# Patient Record
Sex: MALE | Race: BLACK OR AFRICAN AMERICAN | Employment: UNEMPLOYED | URBAN - METROPOLITAN AREA
[De-identification: names, ages, dates, MRNs, and addresses within clinical notes are randomized per-mention and may not be internally consistent; named-entity substitution may affect disease eponyms.]

---

## 2023-12-17 ENCOUNTER — APPOINTMENT (OUTPATIENT)
Dept: GENERAL RADIOLOGY | Facility: HOSPITAL | Age: 38
End: 2023-12-17
Attending: EMERGENCY MEDICINE
Payer: MEDICAID

## 2023-12-17 ENCOUNTER — APPOINTMENT (OUTPATIENT)
Dept: CT IMAGING | Facility: HOSPITAL | Age: 38
End: 2023-12-17
Attending: EMERGENCY MEDICINE
Payer: MEDICAID

## 2023-12-17 ENCOUNTER — APPOINTMENT (OUTPATIENT)
Dept: ULTRASOUND IMAGING | Facility: HOSPITAL | Age: 38
End: 2023-12-17
Attending: EMERGENCY MEDICINE
Payer: MEDICAID

## 2023-12-17 ENCOUNTER — HOSPITAL ENCOUNTER (INPATIENT)
Facility: HOSPITAL | Age: 38
LOS: 3 days | Discharge: HOME | End: 2023-12-20
Attending: EMERGENCY MEDICINE | Admitting: INTERNAL MEDICINE
Payer: MEDICAID

## 2023-12-17 DIAGNOSIS — J01.90 ACUTE SINUSITIS, RECURRENCE NOT SPECIFIED, UNSPECIFIED LOCATION: ICD-10-CM

## 2023-12-17 DIAGNOSIS — E87.5 HYPERKALEMIA: ICD-10-CM

## 2023-12-17 DIAGNOSIS — R11.2 NAUSEA AND VOMITING, UNSPECIFIED VOMITING TYPE: ICD-10-CM

## 2023-12-17 DIAGNOSIS — H91.93 BILATERAL CHANGE IN HEARING: Primary | ICD-10-CM

## 2023-12-17 DIAGNOSIS — N50.812 PAIN IN LEFT TESTICLE: ICD-10-CM

## 2023-12-17 DIAGNOSIS — N45.2 ORCHITIS: ICD-10-CM

## 2023-12-17 DIAGNOSIS — E11.10 DIABETIC KETOACIDOSIS WITHOUT COMA ASSOCIATED WITH TYPE 2 DIABETES MELLITUS (HCC): ICD-10-CM

## 2023-12-17 PROBLEM — E10.10 TYPE 1 DIABETES MELLITUS WITH KETOACIDOSIS WITHOUT COMA (HCC): Status: ACTIVE | Noted: 2023-12-17

## 2023-12-17 PROBLEM — E11.00 TYPE 2 DIABETES MELLITUS WITH HYPEROSMOLAR HYPERGLYCEMIC STATE (HHS) (HCC): Status: ACTIVE | Noted: 2023-12-17

## 2023-12-17 PROBLEM — E87.1 HYPONATREMIA: Status: ACTIVE | Noted: 2023-12-17

## 2023-12-17 PROBLEM — R73.9 HYPERGLYCEMIA: Status: ACTIVE | Noted: 2023-12-17

## 2023-12-17 PROBLEM — E87.20 METABOLIC ACIDOSIS: Status: ACTIVE | Noted: 2023-12-17

## 2023-12-17 LAB
ALBUMIN SERPL-MCNC: 4.5 G/DL (ref 3.4–5)
ALBUMIN/GLOB SERPL: 1.1 {RATIO} (ref 1–2)
ALP LIVER SERPL-CCNC: 145 U/L
ALT SERPL-CCNC: 28 U/L
ANION GAP SERPL CALC-SCNC: 17 MMOL/L (ref 0–18)
ANION GAP SERPL CALC-SCNC: 21 MMOL/L (ref 0–18)
ANION GAP SERPL CALC-SCNC: 24 MMOL/L (ref 0–18)
AST SERPL-CCNC: 9 U/L (ref 15–37)
BASE EXCESS BLDV CALC-SCNC: -17 MMOL/L
BASOPHILS # BLD AUTO: 0.04 X10(3) UL (ref 0–0.2)
BASOPHILS NFR BLD AUTO: 0.5 %
BILIRUB SERPL-MCNC: 1.1 MG/DL (ref 0.1–2)
BILIRUB UR QL STRIP.AUTO: NEGATIVE
BUN BLD-MCNC: 27 MG/DL (ref 9–23)
BUN BLD-MCNC: 30 MG/DL (ref 9–23)
BUN BLD-MCNC: 31 MG/DL (ref 9–23)
CA-I BLD-SCNC: 1.03 MMOL/L (ref 0.95–1.32)
CALCIUM BLD-MCNC: 7.7 MG/DL (ref 8.5–10.1)
CALCIUM BLD-MCNC: 8.2 MG/DL (ref 8.5–10.1)
CALCIUM BLD-MCNC: 8.8 MG/DL (ref 8.5–10.1)
CHLORIDE SERPL-SCNC: 106 MMOL/L (ref 98–112)
CHLORIDE SERPL-SCNC: 86 MMOL/L (ref 98–112)
CHLORIDE SERPL-SCNC: 98 MMOL/L (ref 98–112)
CLARITY UR REFRACT.AUTO: CLEAR
CO2 SERPL-SCNC: 12 MMOL/L (ref 21–32)
CO2 SERPL-SCNC: 13 MMOL/L (ref 21–32)
CO2 SERPL-SCNC: 14 MMOL/L (ref 21–32)
COLOR UR AUTO: COLORLESS
CREAT BLD-MCNC: 2.58 MG/DL
CREAT BLD-MCNC: 2.84 MG/DL
CREAT BLD-MCNC: 3.17 MG/DL
EGFRCR SERPLBLD CKD-EPI 2021: 25 ML/MIN/1.73M2 (ref 60–?)
EGFRCR SERPLBLD CKD-EPI 2021: 29 ML/MIN/1.73M2 (ref 60–?)
EGFRCR SERPLBLD CKD-EPI 2021: 32 ML/MIN/1.73M2 (ref 60–?)
EOSINOPHIL # BLD AUTO: 0.01 X10(3) UL (ref 0–0.7)
EOSINOPHIL NFR BLD AUTO: 0.1 %
ERYTHROCYTE [DISTWIDTH] IN BLOOD BY AUTOMATED COUNT: 13.2 %
FLUAV + FLUBV RNA SPEC NAA+PROBE: NEGATIVE
FLUAV + FLUBV RNA SPEC NAA+PROBE: NEGATIVE
GLOBULIN PLAS-MCNC: 4.1 G/DL (ref 2.8–4.4)
GLUCOSE BLD-MCNC: 244 MG/DL (ref 70–99)
GLUCOSE BLD-MCNC: 294 MG/DL (ref 70–99)
GLUCOSE BLD-MCNC: 325 MG/DL (ref 70–99)
GLUCOSE BLD-MCNC: 445 MG/DL (ref 70–99)
GLUCOSE BLD-MCNC: 445 MG/DL (ref 70–99)
GLUCOSE BLD-MCNC: 479 MG/DL (ref 70–99)
GLUCOSE BLD-MCNC: 484 MG/DL (ref 70–99)
GLUCOSE BLD-MCNC: 593 MG/DL (ref 70–99)
GLUCOSE BLD-MCNC: 791 MG/DL (ref 70–99)
GLUCOSE BLD-MCNC: 961 MG/DL (ref 70–99)
GLUCOSE UR STRIP.AUTO-MCNC: >1000 MG/DL
HCO3 BLDV-SCNC: 10.6 MEQ/L (ref 22–26)
HCT VFR BLD AUTO: 55 %
HGB BLD-MCNC: 18.6 G/DL
IMM GRANULOCYTES # BLD AUTO: 0.04 X10(3) UL (ref 0–1)
IMM GRANULOCYTES NFR BLD: 0.5 %
KETONES UR STRIP.AUTO-MCNC: 100 MG/DL
LEUKOCYTE ESTERASE UR QL STRIP.AUTO: NEGATIVE
LIPASE SERPL-CCNC: 117 U/L (ref 13–75)
LYMPHOCYTES # BLD AUTO: 1.58 X10(3) UL (ref 1–4)
LYMPHOCYTES NFR BLD AUTO: 19 %
MAGNESIUM SERPL-MCNC: 2.8 MG/DL (ref 1.6–2.6)
MCH RBC QN AUTO: 28.5 PG (ref 26–34)
MCHC RBC AUTO-ENTMCNC: 33.8 G/DL (ref 31–37)
MCV RBC AUTO: 84.2 FL
MONOCYTES # BLD AUTO: 0.54 X10(3) UL (ref 0.1–1)
MONOCYTES NFR BLD AUTO: 6.5 %
MRSA DNA SPEC QL NAA+PROBE: NEGATIVE
NEUTROPHILS # BLD AUTO: 6.12 X10 (3) UL (ref 1.5–7.7)
NEUTROPHILS # BLD AUTO: 6.12 X10(3) UL (ref 1.5–7.7)
NEUTROPHILS NFR BLD AUTO: 73.4 %
NITRITE UR QL STRIP.AUTO: NEGATIVE
OSMOLALITY SERPL CALC.SUM OF ELEC: 308 MOSM/KG (ref 275–295)
OSMOLALITY SERPL CALC.SUM OF ELEC: 310 MOSM/KG (ref 275–295)
OSMOLALITY SERPL CALC.SUM OF ELEC: 317 MOSM/KG (ref 275–295)
OXYHGB MFR BLDV: 59.8 % (ref 72–78)
PCO2 BLDV: 32 MM HG (ref 38–50)
PH BLDV: 7.14 [PH] (ref 7.33–7.43)
PH UR STRIP.AUTO: 5 [PH] (ref 5–8)
PHOSPHATE SERPL-MCNC: 3.2 MG/DL (ref 2.5–4.9)
PLATELET # BLD AUTO: 320 10(3)UL (ref 150–450)
PO2 BLDV: 36 MM HG (ref 30–50)
POTASSIUM BLD-SCNC: 8.2 MMOL/L (ref 3.6–5.1)
POTASSIUM SERPL-SCNC: 4.1 MMOL/L (ref 3.5–5.1)
POTASSIUM SERPL-SCNC: 5.2 MMOL/L (ref 3.5–5.1)
POTASSIUM SERPL-SCNC: 6.8 MMOL/L (ref 3.5–5.1)
PROT SERPL-MCNC: 8.6 G/DL (ref 6.4–8.2)
RBC # BLD AUTO: 6.53 X10(6)UL
RSV RNA SPEC NAA+PROBE: NEGATIVE
SARS-COV-2 RNA RESP QL NAA+PROBE: NOT DETECTED
SODIUM BLD-SCNC: 119 MMOL/L (ref 135–145)
SODIUM SERPL-SCNC: 123 MMOL/L (ref 136–145)
SODIUM SERPL-SCNC: 131 MMOL/L (ref 136–145)
SODIUM SERPL-SCNC: 137 MMOL/L (ref 136–145)
SP GR UR STRIP.AUTO: 1.03 (ref 1–1.03)
TROPONIN I SERPL HS-MCNC: 33 NG/L
UROBILINOGEN UR STRIP.AUTO-MCNC: NORMAL MG/DL
WBC # BLD AUTO: 8.3 X10(3) UL (ref 4–11)

## 2023-12-17 PROCEDURE — 3044F HG A1C LEVEL LT 7.0%: CPT | Performed by: NURSE PRACTITIONER

## 2023-12-17 PROCEDURE — 99291 CRITICAL CARE FIRST HOUR: CPT | Performed by: NURSE PRACTITIONER

## 2023-12-17 PROCEDURE — 99291 CRITICAL CARE FIRST HOUR: CPT | Performed by: HOSPITALIST

## 2023-12-17 PROCEDURE — 93975 VASCULAR STUDY: CPT | Performed by: EMERGENCY MEDICINE

## 2023-12-17 PROCEDURE — 71045 X-RAY EXAM CHEST 1 VIEW: CPT | Performed by: EMERGENCY MEDICINE

## 2023-12-17 PROCEDURE — 70450 CT HEAD/BRAIN W/O DYE: CPT | Performed by: EMERGENCY MEDICINE

## 2023-12-17 PROCEDURE — 76870 US EXAM SCROTUM: CPT | Performed by: EMERGENCY MEDICINE

## 2023-12-17 RX ORDER — VANCOMYCIN 2 GRAM/500 ML IN 0.9 % SODIUM CHLORIDE INTRAVENOUS
2000 ONCE
Status: DISCONTINUED | OUTPATIENT
Start: 2023-12-17 | End: 2023-12-17

## 2023-12-17 RX ORDER — PROCHLORPERAZINE EDISYLATE 5 MG/ML
5 INJECTION INTRAMUSCULAR; INTRAVENOUS EVERY 8 HOURS PRN
Status: DISCONTINUED | OUTPATIENT
Start: 2023-12-17 | End: 2023-12-20

## 2023-12-17 RX ORDER — ECHINACEA PURPUREA EXTRACT 125 MG
1 TABLET ORAL
Status: DISCONTINUED | OUTPATIENT
Start: 2023-12-17 | End: 2023-12-20

## 2023-12-17 RX ORDER — ACETAMINOPHEN 500 MG
1000 TABLET ORAL EVERY 4 HOURS PRN
Status: DISCONTINUED | OUTPATIENT
Start: 2023-12-17 | End: 2023-12-20

## 2023-12-17 RX ORDER — DEXTROSE AND SODIUM CHLORIDE 5; .45 G/100ML; G/100ML
100 INJECTION, SOLUTION INTRAVENOUS CONTINUOUS PRN
Status: DISCONTINUED | OUTPATIENT
Start: 2023-12-17 | End: 2023-12-20

## 2023-12-17 RX ORDER — NICOTINE 21 MG/24HR
1 PATCH, TRANSDERMAL 24 HOURS TRANSDERMAL DAILY
Status: DISCONTINUED | OUTPATIENT
Start: 2023-12-17 | End: 2023-12-20

## 2023-12-17 RX ORDER — CALCIUM GLUCONATE 10 MG/ML
1 INJECTION, SOLUTION INTRAVENOUS ONCE
Status: COMPLETED | OUTPATIENT
Start: 2023-12-17 | End: 2023-12-17

## 2023-12-17 RX ORDER — NICOTINE POLACRILEX 4 MG
15 LOZENGE BUCCAL
Status: DISCONTINUED | OUTPATIENT
Start: 2023-12-17 | End: 2023-12-20

## 2023-12-17 RX ORDER — SENNOSIDES 8.6 MG
17.2 TABLET ORAL NIGHTLY PRN
Status: DISCONTINUED | OUTPATIENT
Start: 2023-12-17 | End: 2023-12-20

## 2023-12-17 RX ORDER — SODIUM CHLORIDE, SODIUM LACTATE, POTASSIUM CHLORIDE, CALCIUM CHLORIDE 600; 310; 30; 20 MG/100ML; MG/100ML; MG/100ML; MG/100ML
INJECTION, SOLUTION INTRAVENOUS ONCE
Status: COMPLETED | OUTPATIENT
Start: 2023-12-17 | End: 2023-12-17

## 2023-12-17 RX ORDER — LABETALOL HYDROCHLORIDE 5 MG/ML
10 INJECTION, SOLUTION INTRAVENOUS EVERY 4 HOURS PRN
Status: DISCONTINUED | OUTPATIENT
Start: 2023-12-17 | End: 2023-12-20

## 2023-12-17 RX ORDER — BISACODYL 10 MG
10 SUPPOSITORY, RECTAL RECTAL
Status: DISCONTINUED | OUTPATIENT
Start: 2023-12-17 | End: 2023-12-20

## 2023-12-17 RX ORDER — ONDANSETRON 2 MG/ML
4 INJECTION INTRAMUSCULAR; INTRAVENOUS EVERY 6 HOURS PRN
Status: DISCONTINUED | OUTPATIENT
Start: 2023-12-17 | End: 2023-12-20

## 2023-12-17 RX ORDER — ONDANSETRON 2 MG/ML
4 INJECTION INTRAMUSCULAR; INTRAVENOUS ONCE
Status: COMPLETED | OUTPATIENT
Start: 2023-12-17 | End: 2023-12-17

## 2023-12-17 RX ORDER — POLYETHYLENE GLYCOL 3350 17 G/17G
17 POWDER, FOR SOLUTION ORAL DAILY PRN
Status: DISCONTINUED | OUTPATIENT
Start: 2023-12-17 | End: 2023-12-20

## 2023-12-17 RX ORDER — NICOTINE POLACRILEX 4 MG
30 LOZENGE BUCCAL
Status: DISCONTINUED | OUTPATIENT
Start: 2023-12-17 | End: 2023-12-20

## 2023-12-17 RX ORDER — HEPARIN SODIUM 5000 [USP'U]/ML
5000 INJECTION, SOLUTION INTRAVENOUS; SUBCUTANEOUS EVERY 8 HOURS SCHEDULED
Status: DISCONTINUED | OUTPATIENT
Start: 2023-12-17 | End: 2023-12-18

## 2023-12-17 RX ORDER — DEXTROSE MONOHYDRATE 25 G/50ML
50 INJECTION, SOLUTION INTRAVENOUS
Status: DISCONTINUED | OUTPATIENT
Start: 2023-12-17 | End: 2023-12-20

## 2023-12-17 RX ORDER — MELATONIN
3 NIGHTLY PRN
Status: DISCONTINUED | OUTPATIENT
Start: 2023-12-17 | End: 2023-12-20

## 2023-12-17 RX ORDER — SODIUM CHLORIDE 9 MG/ML
INJECTION, SOLUTION INTRAVENOUS CONTINUOUS
Status: DISCONTINUED | OUTPATIENT
Start: 2023-12-17 | End: 2023-12-20

## 2023-12-17 RX ORDER — ENEMA 19; 7 G/133ML; G/133ML
1 ENEMA RECTAL ONCE AS NEEDED
Status: DISCONTINUED | OUTPATIENT
Start: 2023-12-17 | End: 2023-12-20

## 2023-12-17 NOTE — PROGRESS NOTES
ED Antibiotic Dose Adjustment by Pharmacy    Piperacillin-tazobactam 3.375 gm IVPB x 1 dose has been ordered in the ED. Pharmacy has adjusted the dose to piperacillin-tazobactam 4.5 gm IVPB x1 per hospital protocol based on actual body weight >100 kg.     Thank you,  Yunior Roy, PharmD, BCPS, [facility]  12/17/23; 4:04 PM

## 2023-12-18 PROBLEM — H66.90 ACUTE OTITIS MEDIA: Status: ACTIVE | Noted: 2023-12-18

## 2023-12-18 PROBLEM — N45.2 ORCHITIS: Status: ACTIVE | Noted: 2023-12-18

## 2023-12-18 LAB
ANION GAP SERPL CALC-SCNC: 8 MMOL/L (ref 0–18)
ANION GAP SERPL CALC-SCNC: 9 MMOL/L (ref 0–18)
ATRIAL RATE: 106 BPM
ATRIAL RATE: 119 BPM
BUN BLD-MCNC: 26 MG/DL (ref 9–23)
BUN BLD-MCNC: 26 MG/DL (ref 9–23)
C TRACH DNA SPEC QL NAA+PROBE: NEGATIVE
CALCIUM BLD-MCNC: 8.2 MG/DL (ref 8.5–10.1)
CALCIUM BLD-MCNC: 8.3 MG/DL (ref 8.5–10.1)
CHLORIDE SERPL-SCNC: 109 MMOL/L (ref 98–112)
CHLORIDE SERPL-SCNC: 110 MMOL/L (ref 98–112)
CO2 SERPL-SCNC: 20 MMOL/L (ref 21–32)
CO2 SERPL-SCNC: 20 MMOL/L (ref 21–32)
CREAT BLD-MCNC: 1.98 MG/DL
CREAT BLD-MCNC: 2.53 MG/DL
CREAT UR-SCNC: 161 MG/DL
EGFRCR SERPLBLD CKD-EPI 2021: 33 ML/MIN/1.73M2 (ref 60–?)
EGFRCR SERPLBLD CKD-EPI 2021: 44 ML/MIN/1.73M2 (ref 60–?)
ERYTHROCYTE [DISTWIDTH] IN BLOOD BY AUTOMATED COUNT: 12.7 %
GLUCOSE BLD-MCNC: 155 MG/DL (ref 70–99)
GLUCOSE BLD-MCNC: 166 MG/DL (ref 70–99)
GLUCOSE BLD-MCNC: 169 MG/DL (ref 70–99)
GLUCOSE BLD-MCNC: 175 MG/DL (ref 70–99)
GLUCOSE BLD-MCNC: 184 MG/DL (ref 70–99)
GLUCOSE BLD-MCNC: 190 MG/DL (ref 70–99)
GLUCOSE BLD-MCNC: 197 MG/DL (ref 70–99)
GLUCOSE BLD-MCNC: 209 MG/DL (ref 70–99)
GLUCOSE BLD-MCNC: 210 MG/DL (ref 70–99)
GLUCOSE BLD-MCNC: 268 MG/DL (ref 70–99)
GLUCOSE BLD-MCNC: 268 MG/DL (ref 70–99)
GLUCOSE BLD-MCNC: 294 MG/DL (ref 70–99)
GLUCOSE BLD-MCNC: 317 MG/DL (ref 70–99)
HCT VFR BLD AUTO: 44.3 %
HGB BLD-MCNC: 15.7 G/DL
MAGNESIUM SERPL-MCNC: 2.6 MG/DL (ref 1.6–2.6)
MAGNESIUM SERPL-MCNC: 2.6 MG/DL (ref 1.6–2.6)
MCH RBC QN AUTO: 28.5 PG (ref 26–34)
MCHC RBC AUTO-ENTMCNC: 35.4 G/DL (ref 31–37)
MCV RBC AUTO: 80.4 FL
MICROALBUMIN UR-MCNC: 4.73 MG/DL
MICROALBUMIN/CREAT 24H UR-RTO: 29.4 UG/MG (ref ?–30)
N GONORRHOEA DNA SPEC QL NAA+PROBE: NEGATIVE
OSMOLALITY SERPL CALC.SUM OF ELEC: 296 MOSM/KG (ref 275–295)
OSMOLALITY SERPL CALC.SUM OF ELEC: 300 MOSM/KG (ref 275–295)
P AXIS: 17 DEGREES
P AXIS: 25 DEGREES
P-R INTERVAL: 166 MS
P-R INTERVAL: 182 MS
PHOSPHATE SERPL-MCNC: 1.2 MG/DL (ref 2.5–4.9)
PHOSPHATE SERPL-MCNC: 2.3 MG/DL (ref 2.5–4.9)
PLATELET # BLD AUTO: 256 10(3)UL (ref 150–450)
POTASSIUM SERPL-SCNC: 3.4 MMOL/L (ref 3.5–5.1)
POTASSIUM SERPL-SCNC: 4 MMOL/L (ref 3.5–5.1)
Q-T INTERVAL: 340 MS
Q-T INTERVAL: 344 MS
QRS DURATION: 84 MS
QRS DURATION: 86 MS
QTC CALCULATION (BEZET): 456 MS
QTC CALCULATION (BEZET): 478 MS
R AXIS: 58 DEGREES
R AXIS: 89 DEGREES
RBC # BLD AUTO: 5.51 X10(6)UL
SODIUM SERPL-SCNC: 138 MMOL/L (ref 136–145)
SODIUM SERPL-SCNC: 138 MMOL/L (ref 136–145)
SODIUM SERPL-SCNC: 28 MMOL/L
T AXIS: 25 DEGREES
T AXIS: 3 DEGREES
URATE SERPL-MCNC: 10.7 MG/DL
VENTRICULAR RATE: 106 BPM
VENTRICULAR RATE: 119 BPM
WBC # BLD AUTO: 7.1 X10(3) UL (ref 4–11)

## 2023-12-18 PROCEDURE — 99233 SBSQ HOSP IP/OBS HIGH 50: CPT | Performed by: INTERNAL MEDICINE

## 2023-12-18 PROCEDURE — 99222 1ST HOSP IP/OBS MODERATE 55: CPT

## 2023-12-18 PROCEDURE — 3061F NEG MICROALBUMINURIA REV: CPT | Performed by: NURSE PRACTITIONER

## 2023-12-18 PROCEDURE — 99233 SBSQ HOSP IP/OBS HIGH 50: CPT | Performed by: HOSPITALIST

## 2023-12-18 RX ORDER — ENOXAPARIN SODIUM 100 MG/ML
40 INJECTION SUBCUTANEOUS DAILY
Status: DISCONTINUED | OUTPATIENT
Start: 2023-12-18 | End: 2023-12-20

## 2023-12-18 RX ORDER — POTASSIUM CHLORIDE 14.9 MG/ML
20 INJECTION INTRAVENOUS ONCE
Qty: 100 ML | Refills: 0 | Status: COMPLETED | OUTPATIENT
Start: 2023-12-18 | End: 2023-12-18

## 2023-12-18 RX ORDER — AMLODIPINE BESYLATE 5 MG/1
5 TABLET ORAL DAILY
Status: DISCONTINUED | OUTPATIENT
Start: 2023-12-18 | End: 2023-12-20

## 2023-12-18 RX ORDER — ENOXAPARIN SODIUM 100 MG/ML
40 INJECTION SUBCUTANEOUS DAILY
Status: DISCONTINUED | OUTPATIENT
Start: 2023-12-18 | End: 2023-12-18

## 2023-12-18 NOTE — PROGRESS NOTES
[facility] Pharmacy Note:  Renal Dose Adjustment for enoxaparin (LOVENOX)    Elida Palmer has been prescribed enoxaparin 30 mg subcutaneously every 24 hours. Estimated Creatinine Clearance: 58.3 mL/min (A) (based on SCr of 1.98 mg/dL (H)). Calculated CrCl greater than 30 mL/min so the dose of Enoxaparin (LOVENOX) has been changed to enoxaparin 40 mg every 24 hours per P&T approved protocol. Pharmacy will continue to follow, and make additional adjustments if needed.       Thank you,  Delia Fox, PharmD  12/18/2023 10:52 AM

## 2023-12-18 NOTE — PLAN OF CARE
Assumed care of pt at approximately 2000. Received pt resting in bed on RA, insulin gtt and IVF infusing. Pt A&Ox4. 2L NC overnight. NSR/ST on monitor. Afebrile. Up to bathroom w/standby assist. Bmx1, c/o diarrhea, C. Diff protocol, need to collect stool sample. Urinal at bedside. BMP/Mg/Phos Q4. Denies pain. Transitioned to subcutaneous insulin per orders. See flowsheet/MAR. Family member at bedside overnight.

## 2023-12-19 ENCOUNTER — APPOINTMENT (OUTPATIENT)
Dept: ULTRASOUND IMAGING | Facility: HOSPITAL | Age: 38
End: 2023-12-19
Attending: INTERNAL MEDICINE
Payer: MEDICAID

## 2023-12-19 LAB
ANION GAP SERPL CALC-SCNC: 11 MMOL/L (ref 0–18)
ANION GAP SERPL CALC-SCNC: 8 MMOL/L (ref 0–18)
BASOPHILS # BLD AUTO: 0.03 X10(3) UL (ref 0–0.2)
BASOPHILS NFR BLD AUTO: 0.6 %
BUN BLD-MCNC: 12 MG/DL (ref 9–23)
BUN BLD-MCNC: 14 MG/DL (ref 9–23)
CALCIUM BLD-MCNC: 7.5 MG/DL (ref 8.5–10.1)
CALCIUM BLD-MCNC: 8.1 MG/DL (ref 8.5–10.1)
CHLORIDE SERPL-SCNC: 105 MMOL/L (ref 98–112)
CHLORIDE SERPL-SCNC: 105 MMOL/L (ref 98–112)
CO2 SERPL-SCNC: 19 MMOL/L (ref 21–32)
CO2 SERPL-SCNC: 19 MMOL/L (ref 21–32)
CREAT BLD-MCNC: 1.38 MG/DL
CREAT BLD-MCNC: 1.65 MG/DL
EGFRCR SERPLBLD CKD-EPI 2021: 55 ML/MIN/1.73M2 (ref 60–?)
EGFRCR SERPLBLD CKD-EPI 2021: 68 ML/MIN/1.73M2 (ref 60–?)
EOSINOPHIL # BLD AUTO: 0.08 X10(3) UL (ref 0–0.7)
EOSINOPHIL NFR BLD AUTO: 1.5 %
ERYTHROCYTE [DISTWIDTH] IN BLOOD BY AUTOMATED COUNT: 12.6 %
GLUCOSE BLD-MCNC: 204 MG/DL (ref 70–99)
GLUCOSE BLD-MCNC: 257 MG/DL (ref 70–99)
GLUCOSE BLD-MCNC: 258 MG/DL (ref 70–99)
GLUCOSE BLD-MCNC: 306 MG/DL (ref 70–99)
GLUCOSE BLD-MCNC: 313 MG/DL (ref 70–99)
GLUCOSE BLD-MCNC: 345 MG/DL (ref 70–99)
GLUCOSE BLD-MCNC: >600 MG/DL (ref 70–99)
GLUCOSE BLD-MCNC: >600 MG/DL (ref 70–99)
HCT VFR BLD AUTO: 40.6 %
HGB BLD-MCNC: 13.6 G/DL
HGBA1C: >15.5 %
IMM GRANULOCYTES # BLD AUTO: 0.02 X10(3) UL (ref 0–1)
IMM GRANULOCYTES NFR BLD: 0.4 %
LYMPHOCYTES # BLD AUTO: 2.28 X10(3) UL (ref 1–4)
LYMPHOCYTES NFR BLD AUTO: 42.2 %
MCH RBC QN AUTO: 28.8 PG (ref 26–34)
MCHC RBC AUTO-ENTMCNC: 33.5 G/DL (ref 31–37)
MCV RBC AUTO: 85.8 FL
MONOCYTES # BLD AUTO: 0.37 X10(3) UL (ref 0.1–1)
MONOCYTES NFR BLD AUTO: 6.9 %
NEUTROPHILS # BLD AUTO: 2.62 X10 (3) UL (ref 1.5–7.7)
NEUTROPHILS # BLD AUTO: 2.62 X10(3) UL (ref 1.5–7.7)
NEUTROPHILS NFR BLD AUTO: 48.4 %
OSMOLALITY SERPL CALC.SUM OF ELEC: 288 MOSM/KG (ref 275–295)
OSMOLALITY SERPL CALC.SUM OF ELEC: 289 MOSM/KG (ref 275–295)
PHOSPHATE SERPL-MCNC: 2.4 MG/DL (ref 2.5–4.9)
PLATELET # BLD AUTO: 181 10(3)UL (ref 150–450)
POTASSIUM SERPL-SCNC: 3.6 MMOL/L (ref 3.5–5.1)
POTASSIUM SERPL-SCNC: 3.8 MMOL/L (ref 3.5–5.1)
POTASSIUM SERPL-SCNC: 3.8 MMOL/L (ref 3.5–5.1)
RBC # BLD AUTO: 4.73 X10(6)UL
SODIUM SERPL-SCNC: 132 MMOL/L (ref 136–145)
SODIUM SERPL-SCNC: 135 MMOL/L (ref 136–145)
WBC # BLD AUTO: 5.4 X10(3) UL (ref 4–11)

## 2023-12-19 PROCEDURE — 76775 US EXAM ABDO BACK WALL LIM: CPT | Performed by: INTERNAL MEDICINE

## 2023-12-19 PROCEDURE — 99231 SBSQ HOSP IP/OBS SF/LOW 25: CPT

## 2023-12-19 PROCEDURE — 99233 SBSQ HOSP IP/OBS HIGH 50: CPT | Performed by: STUDENT IN AN ORGANIZED HEALTH CARE EDUCATION/TRAINING PROGRAM

## 2023-12-19 NOTE — PROGRESS NOTES
Received patient from ICU around [address] [place] [address] Rd. Patient A & O x4. VSS, on 2L PRN. C/o trouble with hearing to both ears. IV abx. Safety measures in place. Instructed to use call light.

## 2023-12-19 NOTE — PROGRESS NOTES
Dr. Talbert Drivers called back, he said to call [facility] for this consult. Dr. Donald Petit 's office notified of consult.

## 2023-12-19 NOTE — PROGRESS NOTES
Alert and oriented x 4. Plan to continue educate patient regarding Diabetes. Will have patient administer Insulin subcutaneous and have him watch diabetes education video.

## 2023-12-19 NOTE — PLAN OF CARE
Assumed care of pt at approximately 1930. Received pt resting in bed on RA. Pt A&Ox4. 2L NC overnight. NSR on monitor. Afebrile. Denies pain. Pt still c/o trouble hearing, scheduled IV abx. Pt and family member viewed Understanding Diabetes video. See flowsheet/MAR. Family member at bedside overnight. Transfer orders. Called and gave report to Ortho RN at approximately 2330. Transferred pt w/belongings and family member at approximately 46.

## 2023-12-19 NOTE — PROGRESS NOTES
Dr. Keily Wray ENT on consult for acute bilateral hearing loss  Called in consult for ENT,  Dr. Georgia Hatch on call, message left.

## 2023-12-20 VITALS
HEIGHT: 72.99 IN | TEMPERATURE: 98 F | OXYGEN SATURATION: 96 % | DIASTOLIC BLOOD PRESSURE: 84 MMHG | BODY MASS INDEX: 41.14 KG/M2 | HEART RATE: 87 BPM | SYSTOLIC BLOOD PRESSURE: 134 MMHG | RESPIRATION RATE: 18 BRPM | WEIGHT: 310.44 LBS

## 2023-12-20 LAB
ANION GAP SERPL CALC-SCNC: 8 MMOL/L (ref 0–18)
BUN BLD-MCNC: 8 MG/DL (ref 9–23)
CALCIUM BLD-MCNC: 7.6 MG/DL (ref 8.5–10.1)
CHLORIDE SERPL-SCNC: 109 MMOL/L (ref 98–112)
CO2 SERPL-SCNC: 21 MMOL/L (ref 21–32)
CREAT BLD-MCNC: 1.18 MG/DL
EGFRCR SERPLBLD CKD-EPI 2021: 82 ML/MIN/1.73M2 (ref 60–?)
GAD-65: <5 U/ML
GLUCOSE BLD-MCNC: 150 MG/DL (ref 70–99)
GLUCOSE BLD-MCNC: 156 MG/DL (ref 70–99)
GLUCOSE BLD-MCNC: 175 MG/DL (ref 70–99)
GLUCOSE BLD-MCNC: 176 MG/DL (ref 70–99)
OSMOLALITY SERPL CALC.SUM OF ELEC: 287 MOSM/KG (ref 275–295)
PANCREATIC ISLET CELLS: NEGATIVE
PHOSPHATE SERPL-MCNC: 3.1 MG/DL (ref 2.5–4.9)
POTASSIUM SERPL-SCNC: 3 MMOL/L (ref 3.5–5.1)
POTASSIUM SERPL-SCNC: 3 MMOL/L (ref 3.5–5.1)
POTASSIUM SERPL-SCNC: 3.4 MMOL/L (ref 3.5–5.1)
SODIUM SERPL-SCNC: 138 MMOL/L (ref 136–145)

## 2023-12-20 RX ORDER — OMEPRAZOLE 20 MG/1
20 CAPSULE, DELAYED RELEASE ORAL DAILY
Qty: 30 CAPSULE | Refills: 0 | Status: SHIPPED | OUTPATIENT
Start: 2023-12-20

## 2023-12-20 RX ORDER — LOSARTAN POTASSIUM 25 MG/1
25 TABLET ORAL DAILY
Qty: 90 TABLET | Refills: 0 | Status: SHIPPED | OUTPATIENT
Start: 2023-12-21

## 2023-12-20 RX ORDER — INSULIN LISPRO 100 [IU]/ML
INJECTION, SOLUTION INTRAVENOUS; SUBCUTANEOUS
Qty: 15 ML | Refills: 1 | Status: SHIPPED | OUTPATIENT
Start: 2023-12-20 | End: 2023-12-22

## 2023-12-20 RX ORDER — POTASSIUM CHLORIDE 20 MEQ/1
40 TABLET, EXTENDED RELEASE ORAL EVERY 4 HOURS
Qty: 4 TABLET | Refills: 0 | Status: COMPLETED | OUTPATIENT
Start: 2023-12-20 | End: 2023-12-20

## 2023-12-20 RX ORDER — PANTOPRAZOLE SODIUM 40 MG/1
40 TABLET, DELAYED RELEASE ORAL
Status: DISCONTINUED | OUTPATIENT
Start: 2023-12-20 | End: 2023-12-20

## 2023-12-20 RX ORDER — AMOXICILLIN AND CLAVULANATE POTASSIUM 875; 125 MG/1; MG/1
1 TABLET, FILM COATED ORAL 2 TIMES DAILY
Qty: 8 TABLET | Refills: 0 | Status: SHIPPED | OUTPATIENT
Start: 2023-12-20 | End: 2023-12-24

## 2023-12-20 RX ORDER — INSULIN DETEMIR 100 [IU]/ML
35 INJECTION, SOLUTION SUBCUTANEOUS 2 TIMES DAILY
Qty: 15 ML | Refills: 0 | Status: SHIPPED | OUTPATIENT
Start: 2023-12-20

## 2023-12-20 RX ORDER — INSULIN LISPRO 100 [IU]/ML
INJECTION, SOLUTION INTRAVENOUS; SUBCUTANEOUS
Qty: 15 ML | Refills: 0 | Status: SHIPPED | OUTPATIENT
Start: 2023-12-20 | End: 2023-12-20

## 2023-12-20 RX ORDER — AMLODIPINE BESYLATE 5 MG/1
5 TABLET ORAL DAILY
Qty: 90 TABLET | Refills: 0 | Status: SHIPPED | OUTPATIENT
Start: 2023-12-20 | End: 2024-03-19

## 2023-12-20 RX ORDER — MINERAL OIL/PETROLATUM,WHITE
CREAM (GRAM) TOPICAL AS NEEDED
Status: DISCONTINUED | OUTPATIENT
Start: 2023-12-20 | End: 2023-12-20

## 2023-12-20 RX ORDER — LOSARTAN POTASSIUM 25 MG/1
25 TABLET ORAL DAILY
Status: DISCONTINUED | OUTPATIENT
Start: 2023-12-20 | End: 2023-12-20

## 2023-12-20 RX ORDER — NICOTINE 21 MG/24HR
1 PATCH, TRANSDERMAL 24 HOURS TRANSDERMAL EVERY 24 HOURS
Qty: 28 EACH | Refills: 0 | Status: SHIPPED | OUTPATIENT
Start: 2023-12-20 | End: 2023-12-22 | Stop reason: CLARIF

## 2023-12-20 RX ORDER — PEN NEEDLE, DIABETIC 32GX 5/32"
NEEDLE, DISPOSABLE MISCELLANEOUS
Qty: 100 EACH | Refills: 6 | Status: SHIPPED | OUTPATIENT
Start: 2023-12-20 | End: 2023-12-22 | Stop reason: CLARIF

## 2023-12-20 RX ORDER — BLOOD-GLUCOSE METER
EACH MISCELLANEOUS
Qty: 1 KIT | Refills: 0 | Status: SHIPPED | OUTPATIENT
Start: 2023-12-20 | End: 2023-12-22 | Stop reason: CLARIF

## 2023-12-20 RX ORDER — BLOOD SUGAR DIAGNOSTIC
STRIP MISCELLANEOUS
Qty: 50 STRIP | Refills: 6 | Status: SHIPPED | OUTPATIENT
Start: 2023-12-20

## 2023-12-20 NOTE — DISCHARGE SUMMARY
[facility] PSYCHIATRIC [place] HOSPITALIST  DISCHARGE SUMMARY     Marcia Ernandez Patient Status:  Inpatient    1985 MRN [id number]   [facility] 3SW-A Attending Dane Banda MD   Hosp Day # 3 PCP No primary care provider on file. Date of Admission: 2023  Date of Discharge:   2023     Discharge Disposition: Home    Discharge Diagnosis:  #HHS/DKA  #Uncontrolled DM2 A1c >15  #DONALD, resolved  #B/L otitis media, hearing loss improved  #Possible Left orchitis  #Hyperkalemia with abnormal EKG, resolved  #Hypokalemia, improved  #Acidosis, resolved  #HTN          History of Present Illness: Marcia Ernandez is a 41year old male with a past medical history of HTN, DM. He has had fatigue, weakness and URI symptoms. He comes to the ED with difficulty hearing, blurry vision and testicular swelling. In the ED he is noted to be in DKA with DONALD, otitis media b/l and orchitis. Hyperkalemia treated with improvement. Brief Synopsis: Patient is a 53-year-old male who presented with  Difficulty hearing, blurry vision, testicular swelling. Labs reveal DKA with DONALD. Imaging with signs of otitis media, orchitis. Patient started on IV antibiotics after cultures obtained. He required intervention for hyperkalemia. Continue on IV fluids, insulin drip per protocol. Pulmonary critical care, endocrinology were consulted. Anion gap closed. Patient transition to subcutaneous insulin. Received diabetes education. Currently pending IA-2 autoantibodies, GISSELLE-65 autoantibody. He will follow-up with endocrinology on  as scheduled. Patient transitioning insurance from [place] care Medicaid to [employer] and will be in network for [facility] after . He is aware of his preferred PCP per insurance and plans to make an appointment in 1 week as discussed. TCC referral and appointment scheduled in 2 days to follow-up on glycemic management. Diabetes center referral for further education provided.   Ordered glucometer/supplies and patient will continue on insulin management. [place] sent to pharmacy also. Started on losartan/amlodipine and will need follow-up on blood pressure. He will transition to oral antibiotics at discharge to complete course. He was given referral to follow-up with ENT for hearing assessment. He was instructed to get eye exam.  Discharge home once cleared by consultants. Lace+ Score: 32  59-90 High Risk  29-58 Medium Risk  0-28   Low Risk  Patient was referred to the [facility] Follow-Up Recommendation:  LACE 29-58: Moderate Risk of readmission after discharge from the hospital.      Procedures during hospitalization:  none      Consultants:  Endocrinology, pulmonary critical care         Discharge Medications        START taking these medications        Instructions Prescription details   amLODIPine 5 MG Tabs  Commonly known as: Norvasc      Take 1 tablet (5 mg total) by mouth daily. Stop taking on: March 19, 2024  Quantity: 90 tablet  Refills: 0     amoxicillin clavulanate 875-125 MG Tabs  Commonly known as: Augmentin      Take 1 tablet by mouth 2 (two) times daily for 4 days. Stop taking on: December 24, 2023  Quantity: 8 tablet  Refills: 0     BD Pen Needle Yara U/F 32G X 4 MM Misc  Generic drug: Insulin Pen Needle      Use a new pen needle with each injection as directed by your doctor   Quantity: 100 each  Refills: 6     Insulin Lispro (1 Unit Dial) 100 UNIT/ML Sopn  Commonly known as: HumaLOG KwikPen      Test blood glucose 4 times daily before meals  Inject 1 unit of insulin for every 20 points blood glucose is greater than 140 mg/dL Inject insulin into the skin prior to meals. Inject 8 unit of insulin with each meal, Inject within 10 minutes before or after a meal, max daily dose 80 units   Quantity: 15 mL  Refills: 0     Levemir FlexTouch 100 UNIT/ML Sopn  Generic drug: insulin detemir      Inject 35 Units into the skin 2 (two) times daily. Quantity: 15 mL  Refills: 0     losartan 25 MG Tabs  Commonly known as: Cozaar  Start taking on: December 21, 2023      Take 1 tablet (25 mg total) by mouth daily. Quantity: 90 tablet  Refills: 0     nicotine 14 MG/24HR Pt24  Commonly known as: Nicoderm CQ      Place 1 patch onto the skin daily. Quantity: 28 each  Refills: 0     omeprazole 20 MG Cpdr  Commonly known as: PriLOSEC      Take 1 capsule (20 mg total) by mouth daily. Quantity: 30 capsule  Refills: 0     OneTouch Delica Lancets Fine Misc      Use lancets to check glucose 4 times daily   Quantity: 50 each  Refills: 6     OneTouch Verio Flex System w/Device Kit      Use device to check glucose 4 times daily   Quantity: 1 kit  Refills: 0     OneTouch Verio Strp      Use glucose strips to check glucose 4 times daily   Quantity: 50 strip  Refills: 6               Where to Get Your Medications        These medications were sent to [facility], [phone], [place] [address], [address] [facility], [address] [place] [address]      Phone: [phone]   amLODIPine 5 MG Tabs  amoxicillin clavulanate 875-125 MG Tabs  BD Pen Needle Yara U/F 32G X 4 MM Misc  Levemir FlexTouch 100 UNIT/ML Sopn  losartan 25 MG Tabs  nicotine 14 MG/24HR Pt24  omeprazole 20 MG Cpdr  OneTouch Delica Lancets Fine Misc  OneTouch Verio Flex System w/Device Kit  OneTouch Verio Strp       Please  your prescriptions at the location directed by your doctor or nurse    Bring a paper prescription for each of these medications  Insulin Lispro (1 Unit Dial) 100 UNIT/ML Sopn         [place]    Follow-up appointment:   KIYA Crawford 23  [phone]    Follow up  *note- office does not take [facility], please confirm insurance before scheduling follow up. You can also call [facility] and request names of endocrinology practices that accept your insurance.  Our office DOES take Medicaid- [employer] if you are due for an insurance switch with your [facility]  [address] [facility] Ena Perez [address] [facility] [address] [phone]  Schedule an appointment as soon as possible for a visit in 3 day(s)      eye exam due to newly diagnosed diabetes    Schedule an appointment as soon as possible for a visit in 1 week(s)      Dr. Ole Prince your insurance preferred PCP provided    Schedule an appointment as soon as possible for a visit in 1 week(s)      [facility] [address] [facility] [address]     Schedule an appointment as soon as possible for a visit in 1 week(s)  for hearing loss assessment    [facility] [place]  [address]  [phone]  Schedule an appointment as soon as possible for a visit in 1 week(s)  for further diabetes education - referral ordered in your chart. Future Appointments   Date Time Provider Lebron Beaulieu   2023  1:00 PM ALCON Gutierrez   2024  2:15 PM KIYA Candelario [facility] PATRICIA Marx       -----------------------------------------------------------------------------------------------  PATIENT DISCHARGE INSTRUCTIONS: See electronic chart    GUNNER Porras  2:46 PM     Total time spent on discharge planning:  >30 minutes     The [facility] makes medical notes like these available to patients in the interest of transparency. Please be advised this is a medical document. Medical documents are intended to carry relevant information, facts as evident, and the clinical opinion of the practitioner. The medical note is intended as peer to peer communication and may appear blunt or direct. It is written in medical language and may contain abbreviations or verbiage that are unfamiliar.

## 2023-12-20 NOTE — PLAN OF CARE
Patient A & O x4. VSS, on RA. Denies pain at this time. IVF infusing per order. IV abx. Voiding freely. Safety measures in place. Instructed to use call light.

## 2023-12-20 NOTE — PROGRESS NOTES
AVS reviewed, meds paid for & will be delivered to room, has follow-up appts set up for office visit w/ PCP & for diabetic education, info for diabetes educationgiven, will dc home after dinner.

## 2023-12-20 NOTE — PROGRESS NOTES
Alert and oriented x 4. Noted to have a hard time hearing at times. Ambulated in the hallway, verbalized feeling better after a walk. Continue to educate patient regarding Diabetes. He is able to demonstrate how to do check his blood sugar and inject insulin using insulin pen. BP elevated this morning, Losartan given, BP much better.

## 2023-12-20 NOTE — PLAN OF CARE
Scripts sent for med to bed - will f/u to see what can be filled. Reviewed discharge planning with MD, pt, RN. Updated endo on dc planning and pt switching from [facility] to [facility] after [employer].     Spoke with pt about TCC - called [facility] to coordinate f/u appt in 2 days prior to long holiday weekend. Pt also has name of PCP recommended by insurance and he will try to establish with them.

## 2023-12-21 ENCOUNTER — PATIENT OUTREACH (OUTPATIENT)
Dept: CASE MANAGEMENT | Age: 38
End: 2023-12-21

## 2023-12-21 NOTE — PAYOR COMM NOTE
--------------  DISCHARGE REVIEW    Any Quiroz #:  [id number]  Authorization Number: [id number]    Admit date: 23  Admit time:   5:10 PM  Discharge Date: 2023  6:53 PM     Admitting Physician: Theodore Delaney DO  Attending Physician:  No att. providers found  Primary Care Physician: No primary care provider on file. REVIEWER COMMENTS        [facility]PITAL[facility]  DISCHARGE SUMMARY            Marjan Norman Patient Status:  Inpatient    1985 MRN [id number]   [facility] 3SW-A Attending Clair Tse MD   Hosp Day # 3 PCP No primary care provider on file. Date of Admission: 2023  Date of Discharge:   2023      Discharge Disposition: Home     Discharge Diagnosis:  #HHS/DKA  #Uncontrolled DM2 A1c >15  #DONALD, resolved  #B/L otitis media, hearing loss improved  #Possible Left orchitis  #Hyperkalemia with abnormal EKG, resolved  #Hypokalemia, improved  #Acidosis, resolved  #HTN           History of Present Illness: Marjan Norman is a 41year old male with a past medical history of HTN, DM. He has had fatigue, weakness and URI symptoms. He comes to the ED with difficulty hearing, blurry vision and testicular swelling. In the ED he is noted to be in DKA with DONALD, otitis media b/l and orchitis. Hyperkalemia treated with improvement. Brief Synopsis: Patient is a 61-year-old male who presented with  Difficulty hearing, blurry vision, testicular swelling. Labs reveal DKA with DONALD. Imaging with signs of otitis media, orchitis. Patient started on IV antibiotics after cultures obtained. He required intervention for hyperkalemia. Continue on IV fluids, insulin drip per protocol. Pulmonary critical care, endocrinology were consulted. Anion gap closed. Patient transition to subcutaneous insulin. Received diabetes education. Currently pending IA-2 autoantibodies, GISSELLE-65 autoantibody. He will follow-up with endocrinology on  as scheduled. Patient transitioning insurance from [facility] Medicaid to [employer] and will be in network for [facility] after 1 January. He is aware of his preferred PCP per insurance and plans to make an appointment in 1 week as discussed. TCC referral and appointment scheduled in 2 days to follow-up on glycemic management. Diabetes center referral for further education provided. Ordered glucometer/supplies and patient will continue on insulin management. [place] sent to pharmacy also. Started on losartan/amlodipine and will need follow-up on blood pressure. He will transition to oral antibiotics at discharge to complete course. He was given referral to follow-up with ENT for hearing assessment. He was instructed to get eye exam.  Discharge home once cleared by consultants. Lace+ Score: 32  59-90 High Risk  29-58 Medium Risk  0-28   Low Risk  Patient was referred to the [facility]. TCM Follow-Up Recommendation:  LACE 29-58: Moderate Risk of readmission after discharge from the hospital.        Procedures during hospitalization:  none        Consultants:  Endocrinology, pulmonary critical care            Discharge Medications          START taking these medications         Instructions Prescription details   amLODIPine 5 MG Tabs  Commonly known as: Norvasc       Take 1 tablet (5 mg total) by mouth daily. Stop taking on: March 19, 2024  Quantity: 90 tablet  Refills: 0      amoxicillin clavulanate 875-125 MG Tabs  Commonly known as: Augmentin       Take 1 tablet by mouth 2 (two) times daily for 4 days.     Stop taking on: December 24, 2023  Quantity: 8 tablet  Refills: 0      BD Pen Needle Yara U/F 32G X 4 MM Misc  Generic drug: Insulin Pen Needle       Use a new pen needle with each injection as directed by your doctor    Quantity: 100 each  Refills: 6      Insulin Lispro (1 Unit Dial) 100 UNIT/ML Sopn  Commonly known as: HumaLOG KwikPen       Test blood glucose 4 times daily before meals  Inject 1 unit of insulin for every 20 points blood glucose is greater than 140 mg/dL Inject insulin into the skin prior to meals. Inject 8 unit of insulin with each meal, Inject within 10 minutes before or after a meal, max daily dose 80 units    Quantity: 15 mL  Refills: 0      Levemir FlexTouch 100 UNIT/ML Sopn  Generic drug: insulin detemir       Inject 35 Units into the skin 2 (two) times daily. Quantity: 15 mL  Refills: 0      losartan 25 MG Tabs  Commonly known as: Cozaar  Start taking on: December 21, 2023       Take 1 tablet (25 mg total) by mouth daily. Quantity: 90 tablet  Refills: 0      nicotine 14 MG/24HR Pt24  Commonly known as: Nicoderm CQ       Place 1 patch onto the skin daily. Quantity: 28 each  Refills: 0      omeprazole 20 MG Cpdr  Commonly known as: PriLOSEC       Take 1 capsule (20 mg total) by mouth daily.     Quantity: 30 capsule  Refills: 0      OneTouch Delica Lancets Fine Misc       Use lancets to check glucose 4 times daily    Quantity: 50 each  Refills: 6      OneTouch Verio Flex System w/Device Kit       Use device to check glucose 4 times daily    Quantity: 1 kit  Refills: 0      OneTouch Verio Strp       Use glucose strips to check glucose 4 times daily    Quantity: 50 strip  Refills: 6                    Where to Get Your Medications          These medications were sent to [facility], [phone], [place] [address], [address] [facility], [address] [place] [address]        Phone: [phone]   amLODIPine 5 MG Tabs  amoxicillin clavulanate 875-125 MG Tabs  BD Pen Needle Yara U/F 32G X 4 MM Misc  Levemir FlexTouch 100 UNIT/ML Sopn  losartan 25 MG Tabs  nicotine 14 MG/24HR Pt24  omeprazole 20 MG Cpdr  OneTouch Delica Lancets Fine Misc  OneTouch Verio Flex System w/Device Kit  OneTouch Verio Strp         Please  your prescriptions at the location directed by your doctor or nurse    Bring a paper prescription for each of these medications  Insulin Lispro (1 Unit Dial) 100 UNIT/[facility]            [facility] [place]     Follow-up appointment:   KIYA Santa  Fabio Tamayo 23  [phone]     Follow up  *note- office does not take [facility], please confirm insurance before scheduling follow up. You can also call [facility] and request names of endocrinology practices that accept your insurance. Our office DOES take Medicaid- [facility] if you are due for an insurance switch with your [facility]  [address] [facility] [place]na [place] [id number] [phone]  Schedule an appointment as soon as possible for a visit in 3 day(s)        eye exam due to newly diagnosed diabetes     Schedule an appointment as soon as possible for a visit in 1 week(s)        Dr. Werner Blood your insurance preferred PCP provided     Schedule an appointment as soon as possible for a visit in 1 week(s)        Sukhwinder Ryan [place] [address] 21 [phone]     Schedule an appointment as soon as possible for a visit in 1 week(s)  for hearing loss assessment     [facility] [place] [address] [address] [phone]  Schedule an appointment as soon as possible for a visit in 1 week(s)  for further diabetes education - referral ordered in your chart.                Future Appointments   Date Time Provider Lebron Beaulieu   12/22/2023  1:00 PM ALCON Devries   1/9/2024  2:15 PM [facility] PATRICIA Marx         -----------------------------------------------------------------------------------------------  PATIENT DISCHARGE INSTRUCTIONS: See electronic chart     GUNNER Morales  2:46 PM

## 2023-12-21 NOTE — PROGRESS NOTES
RODRÍGUEZDRE for post hospital follow up. [facility] contact information provided as well as [facility] office number, [phone].

## 2023-12-21 NOTE — PROGRESS NOTES
NURSING DISCHARGE NOTE    Discharged Home via Wheelchair. Accompanied by Spouse  Belongings Taken by patient/family. Discharge instructions discussed with patient and his partner.

## 2023-12-22 ENCOUNTER — OFFICE VISIT (OUTPATIENT)
Dept: INTERNAL MEDICINE CLINIC | Facility: CLINIC | Age: 38
End: 2023-12-22
Payer: MEDICAID

## 2023-12-22 VITALS
TEMPERATURE: 97 F | DIASTOLIC BLOOD PRESSURE: 87 MMHG | WEIGHT: 315 LBS | OXYGEN SATURATION: 99 % | HEIGHT: 73 IN | BODY MASS INDEX: 41.75 KG/M2 | SYSTOLIC BLOOD PRESSURE: 135 MMHG | HEART RATE: 102 BPM | RESPIRATION RATE: 22 BRPM

## 2023-12-22 DIAGNOSIS — I10 PRIMARY HYPERTENSION: ICD-10-CM

## 2023-12-22 DIAGNOSIS — N17.9 AKI (ACUTE KIDNEY INJURY) (HCC): ICD-10-CM

## 2023-12-22 DIAGNOSIS — F17.200 TOBACCO DEPENDENCE: ICD-10-CM

## 2023-12-22 DIAGNOSIS — E11.9 NEW ONSET TYPE 2 DIABETES MELLITUS (HCC): ICD-10-CM

## 2023-12-22 DIAGNOSIS — H65.03 NON-RECURRENT ACUTE SEROUS OTITIS MEDIA OF BOTH EARS: ICD-10-CM

## 2023-12-22 DIAGNOSIS — E87.1 HYPONATREMIA: ICD-10-CM

## 2023-12-22 DIAGNOSIS — N45.2 ORCHITIS: ICD-10-CM

## 2023-12-22 DIAGNOSIS — R05.1 ACUTE COUGH: ICD-10-CM

## 2023-12-22 DIAGNOSIS — K59.00 CONSTIPATION, UNSPECIFIED CONSTIPATION TYPE: ICD-10-CM

## 2023-12-22 DIAGNOSIS — E11.00 TYPE 2 DIABETES MELLITUS WITH HYPEROSMOLAR HYPERGLYCEMIC STATE (HHS) (HCC): ICD-10-CM

## 2023-12-22 DIAGNOSIS — E87.6 HYPOKALEMIA: ICD-10-CM

## 2023-12-22 DIAGNOSIS — E87.5 HYPERKALEMIA: ICD-10-CM

## 2023-12-22 DIAGNOSIS — E11.10 DIABETIC KETOACIDOSIS WITHOUT COMA ASSOCIATED WITH TYPE 2 DIABETES MELLITUS (HCC): Primary | ICD-10-CM

## 2023-12-22 PROBLEM — E10.69 TYPE 1 DIABETES MELLITUS WITH HYPEROSMOLAR HYPERGLYCEMIC STATE (HHS)  (HCC): Status: ACTIVE | Noted: 2023-12-22

## 2023-12-22 PROBLEM — E10.69 TYPE 1 DIABETES MELLITUS WITH HYPEROSMOLAR HYPERGLYCEMIC STATE (HHS) (HCC): Status: ACTIVE | Noted: 2023-12-22

## 2023-12-22 PROBLEM — E10.65 TYPE 1 DIABETES MELLITUS WITH HYPEROSMOLAR HYPERGLYCEMIC STATE (HHS)  (HCC): Status: ACTIVE | Noted: 2023-12-22

## 2023-12-22 PROBLEM — E87.0 TYPE 1 DIABETES MELLITUS WITH HYPEROSMOLAR HYPERGLYCEMIC STATE (HHS) (HCC): Status: ACTIVE | Noted: 2023-12-22

## 2023-12-22 PROBLEM — E87.0 TYPE 1 DIABETES MELLITUS WITH HYPEROSMOLAR HYPERGLYCEMIC STATE (HHS)  (HCC): Status: ACTIVE | Noted: 2023-12-22

## 2023-12-22 PROBLEM — E10.65 TYPE 1 DIABETES MELLITUS WITH HYPEROSMOLAR HYPERGLYCEMIC STATE (HHS) (HCC): Status: ACTIVE | Noted: 2023-12-22

## 2023-12-22 PROCEDURE — 3075F SYST BP GE 130 - 139MM HG: CPT | Performed by: NURSE PRACTITIONER

## 2023-12-22 PROCEDURE — 3008F BODY MASS INDEX DOCD: CPT | Performed by: NURSE PRACTITIONER

## 2023-12-22 PROCEDURE — 99214 OFFICE O/P EST MOD 30 MIN: CPT | Performed by: NURSE PRACTITIONER

## 2023-12-22 PROCEDURE — 3079F DIAST BP 80-89 MM HG: CPT | Performed by: NURSE PRACTITIONER

## 2023-12-22 RX ORDER — INSULIN LISPRO 100 [IU]/ML
INJECTION, SOLUTION INTRAVENOUS; SUBCUTANEOUS
Qty: 15 ML | Refills: 0 | Status: SHIPPED | OUTPATIENT
Start: 2023-12-22

## 2023-12-22 RX ORDER — BENZONATATE 100 MG/1
100 CAPSULE ORAL 3 TIMES DAILY PRN
Qty: 21 CAPSULE | Refills: 0 | Status: SHIPPED | OUTPATIENT
Start: 2023-12-22

## 2023-12-22 NOTE — PROGRESS NOTES
[facility] 6      HISTORY   CHIEF COMPLAINT: HFU-DKA/HHS, new onset DMII, HTN, acute serous otitis media of both ears, orchitis, DONALD, hyperkalemia, hypokalemia, tobacco dependence, acute cough, constipation. HPI: Jostin Horner is a 41year old male here today for hospital follow up for DKA/HHS, new onset DMII, HTN, acute serous otitis media of both ears, orchitis, DONALD, hyperkalemia, hypokalemia, tobacco dependence, acute cough, constipation. Jostin Horner was discharged from [place] [address]  to Home or Self Care. Admit Date: 12/17/23. Discharge Date: 12/20/23. Hospital Discharge Diagnosis:      #HHS/DKA  #Uncontrolled DM2 A1c >15  #DONALD, resolved  #B/L otitis media, hearing loss improved  #Possible Left orchitis  #Hyperkalemia with abnormal EKG, resolved  #Hypokalemia, improved  #Acidosis, resolved  [facility]    Hospital stay was uncomplicated. Jostin Horner was discharge with amlodipine, Augmentin, BG monitoring supplies, NovoLog, Levemir, losartan, nicotine patch, omeprazole and a referral to the [facility] for continued follow up. Today, patient is being seen for hospital follow-up. He reports feeling up/down since discharge. He is accompanied by his wife at time of exam.    He presented to ED with fatigue, weakness, and URI symptoms. He also reported difficulty hearing, blurry vision, and testicular swelling. In ED he was noted to be in DKA with DONALD, BL otitis media, BL orchitis. He was also noted with hyperkalemia which was treated with improvement. He was admitted and started on IV antibiotics after cultures obtained. He required intervention for hyperkalemia. He was treated with IV fluids, insulin drip, electrolyte replacement per DKA protocol. Pulmonary critical care, endocrinology were consulted. AG closed and patient was transition to subcutaneous insulin. Received diabetic education. Currently DMI labs are pending.   He will follow-up with endocrinology on 1/9 as scheduled. Patient will be transitioning insurance from [facility] Medicaid to [employer] and will be in network for [employer] after January 1. He is aware of his preferred PCP per insurance and plans to make appointment in 1 week as discussed. TCC referral and appointment scheduled in 2 days to follow-up on glycemic management. [facility] referral for further education provided. Order glucometer and supplies and patient will continue on insulin management, needles also sent to pharmacy. He was started on losartan and amlodipine for HTN and will need follow-up on blood pressure. He will transition to oral antibiotics at discharge to complete course. He was given referral to follow-up with ENT for hearing assessment. He was also instructed to get an eye exam.  Discharged home once cleared by consultants in good condition. Interactive contact within 2 business days post discharge first initiated on Date: 12/21/2023      Allergies:  No Known Allergies   Current Meds:  Current Outpatient Medications   Medication Sig Dispense Refill    losartan 25 MG Oral Tab Take 1 tablet (25 mg total) by mouth daily. 90 tablet 0    nicotine 14 MG/24HR Transdermal Patch 24 Hr Place 1 patch onto the skin daily. 28 each 0    Insulin Pen Needle (BD PEN NEEDLE JUSTINO U/F) 32G X 4 MM Does not apply Misc Use a new pen needle with each injection as directed by your doctor 100 each 6    insulin detemir (LEVEMIR FLEXTOUCH) 100 UNIT/ML Subcutaneous Solution Pen-injector Inject 35 Units into the skin 2 (two) times daily.  15 mL 0    Blood Glucose Monitoring Suppl (ONETOUCH VERIO FLEX SYSTEM) w/Device Does not apply Kit Use device to check glucose 4 times daily 1 kit 0    Glucose Blood (ONETOUCH VERIO) In Vitro Strip Use glucose strips to check glucose 4 times daily 50 strip 6    OneTouch Delica Lancets Fine Does not apply Misc Use lancets to check glucose 4 times daily 50 each 6    amoxicillin clavulanate 875-125 MG Oral Tab Take 1 tablet by mouth 2 (two) times daily for 4 days. 8 tablet 0    omeprazole 20 MG Oral Capsule Delayed Release Take 1 capsule (20 mg total) by mouth daily. 30 capsule 0    amLODIPine 5 MG Oral Tab Take 1 tablet (5 mg total) by mouth daily. 90 tablet 0    Insulin Lispro, 1 Unit Dial, (HUMALOG KWIKPEN) 100 UNIT/ML Subcutaneous Solution Pen-injector Test blood glucose 4 times daily before meals  Inject 1 unit of insulin for every 20 points blood glucose is greater than 140 mg/dL Inject insulin into the skin prior to meals. Inject 8 unit of insulin with each meal, Inject within 10 minutes before or after a meal, max daily dose 80 units 15 mL 1     No current facility-administered medications for this visit. HISTORY: reconciled and reviewed with patient  Past Medical History:   Diagnosis Date    Essential hypertension       No past surgical history on file. No family history on file. Social History     Socioeconomic History    Marital status: Single   Tobacco Use    Smoking status: Some Days     Packs/day: .5     Types: Cigarettes    Smokeless tobacco: Never     Social Determinants of Health     Food Insecurity: No Food Insecurity (12/17/2023)    Food Insecurity     Food Insecurity: Never true   Transportation Needs: No Transportation Needs (12/17/2023)    Transportation Needs     Lack of Transportation: No   Housing Stability: Low Risk  (12/17/2023)    Housing Stability     Housing Instability: No        Imaging & Consults:  US KIDNEYS (FIK=[id number])    Result Date: 12/19/2023  CONCLUSION:  Sonographically unremarkable kidneys. LOCATION:  [facility]     Dictated by (CST): Twan Kearney MD on 12/19/2023 at 11:57 AM     Finalized by (CST): Twan Kearney MD on 12/19/2023 at 11:57 AM       CT BRAIN OR HEAD ([id number])    Result Date: 12/17/2023  PROCEDURE:  CT BRAIN OR HEAD ([id number])  COMPARISON:  None.   INDICATIONS:  bilateral hearing loss x 2 weeks, nasal congestion  TECHNIQUE:  Noncontrast CT scanning is performed through the brain. Dose reduction techniques were used. Dose information is transmitted to the [facility]) NRDR ([address]) which includes the Dose Index Registry. PATIENT STATED HISTORY: (As transcribed by Technologist)  Patient is having bilateral hearing loss for 2 weeks. FINDINGS: No evidence of intracranial hemorrhage or extra-axial fluid collection. Lucencies in the deep periventricular white matter are likely sequelae of chronic small vessel ischemic disease. Prominence of the sulci is noted. No mass effect. Mucoperiosteal thickening of the paranasal sinuses is noted. Visualized portions of the mastoid air cells are unremarkable. Visualized portions of the orbits are unremarkable. IMPRESSION: Sequelae of chronic small vessel ischemic disease is noted. No evidence of intracranial hemorrhage or extra-axial fluid collection. LOCATION:  [facility]   Dictated by (CST): Donnie Betancourt MD on 12/17/2023 at 6:31 PM     Finalized by (CST): Donnie Betancourt MD on 12/17/2023 at 6:32 PM       [address] (OOA=[id number])    Result Date: 12/17/2023  CONCLUSION:  No evidence of torsion. Asymmetric more prominent vascularity in the left testicle may be due to orchitis. Sequelae of intermittent torsion is considered less likely.    LOCATION:  [facility]    Dictated by (CST): Donnie Betancourt MD on 12/17/2023 at 6:13 PM     Finalized by (CST): Donnie Betancourt MD on 12/17/2023 at 6:14 PM        Lab Results   Component Value Date/Time    WBC 5.4 12/19/2023 05:36 AM    HGB 13.6 12/19/2023 05:36 AM    HCT 40.6 12/19/2023 05:36 AM    .0 12/19/2023 05:36 AM     (H) 12/20/2023 05:44 AM     12/20/2023 05:44 AM    K 3.4 (L) 12/20/2023 03:21 PM     12/20/2023 05:44 AM    CO2 21.0 12/20/2023 05:44 AM    BUN 8 (L) 12/20/2023 05:44 AM    CREATSERUM 1.18 12/20/2023 05:44 AM    CA 7.6 (L) 12/20/2023 05:44 AM    MG 2.6 12/18/2023 04:40 AM PHOS 3.1 12/20/2023 05:44 AM    ALB 4.5 12/17/2023 02:11 PM    ALT 28 12/17/2023 02:11 PM    AST 9 (L) 12/17/2023 02:11 PM    A1C  12/17/2023 02:11 PM      Comment:      Hemoglobin A1C to be confirmed at [facility]    A1C >15.5 (H) 12/17/2023 02:11 PM         There is no immunization history on file for this patient. ROS:  GENERAL: energy fair/stable, denies fever, + improving weakness  SKIN: denies any skin changes  EYES: + improving blurred vision, denies eye pain  HEENT: denies ear pain, loss of hearing, sore throat, + reports sounds are louder now  RESPIRATORY: + nonproductive cough, denies dyspnea with exertion  CARDIOVASCULAR: denies syncope, chest pain, fatigue, palpitations   GI: denies abdominal pain, melena, + constipation, denies diarrhea, nausea, vomiting   : No further testicular swelling, + discomfort to anterior penis  MUSCULOSKELETAL: denies pain, states normal range of motion of extremities  NEUROLOGIC: denies confusion, balance difficulty  PSYCHIATRIC: denies depression or anxiety  HEMATOLOGIC: denies history of anemia, bruising, bleeding    PHYSICAL EXAM:  Vitals:    12/22/23 1355   BP: 135/87   Pulse:    Resp:    Temp:        GENERAL: well developed, well nourished, in no apparent distress, patient and wife are good historians  INTEGUMENTARY: warm, dry, no rashes  HEENT: atraumatic, normocephalic, sclera white, conjunctivae pink  NECK: supple  CHEST: no chest tenderness  LUNGS: clear to auscultation bilaterally, no wheezes, rhonchi or rales, normal respiratory effort  CARDIO: S1, S2, RRR without audible murmur  GI: + BS to all quadrants, no tenderness  : No noted testicular swelling  MUSCULOSKELETAL: + ROM in all joints, no evidence of swelling, pain   EXTREMITIES: no cyanosis, or edema  NEURO: Oriented x3  PSYCHIATRIC: appropriate affect    ASSESSMENT/ PLAN:   1.  Diabetic ketoacidosis/HHS/New onset DMII  Glucose at admission was significantly elevated at 961  AG was 24  Found to have DKA/HHS with new onset DMII  A1c was found to be > 15.5%  MIRIAN labs are pending  Admitted to ICU on DKA protocol and treated with aggressive IVF, insulin drip, electrolyte correction, and antiemetics as needed  AG closed and patient was transitioned to subcutaneous insulin  Reports fatigue is resolved  Improving weakness  He is currently checking BS 3-4 times daily and should continue and keep log and bring with to all follow-ups for review  BS this morning was 152  Reviewed BS log since discharge and in morning running 124-152, before lunch 1 reading of 422, at dinner running 168-219  Discussed if BS is 100 or less in the evening he is to give himself his Levemir and have a bedtime snack and discontinue bedtime dose of short acting insulin  All polys have resolved  Tolerating an oral diet  Appetite is good/drinking well  Having constipation/passing gas  Printed log for monitoring BS given to patient  Started on:  NovoLog insulin 8 units plus ISF of 1: 20 <140-patient did not understand directions at discharge and has only been using correctional scale  Levemir 35 units 2 times daily  Losartan 25 mg daily  Omeprazole 20 mg daily  Goals discussed:  A1c of 6.7-7.0%  Fasting BS of   Premeal  or less  2 hours postprandial  or less  Diabetic preventative care discussed:  Yearly diabetic dilated eye exam- monitor for retinopathy  Dental exams every 6 months  Yearly urine for microalbumin-May need ACE/ARB in the future-losartan  Will need statin addition  Daily thorough foot exams-tabs, bottoms, in between toes-use a mirror-monitor for any wounds, cuts, calluses and report immediately  Do not cut anything off feet  Toenail care- cut toenails straight across and filed down sides to reduce the risk of ingrown toenails  All ingrown toenails to be removed by podiatry  Rx sent for:  Insulin Lispro, 1 Unit Dial, (HUMALOG KWIKPEN) 100 UNIT/ML Subcutaneous Solution Pen-injector;  Test blood glucose 4 times daily before meals Inject 1 unit of insulin for every 20 points blood glucose is greater than 140 mg/dL Inject insulin into the skin prior to meals. Max daily dose 50 units  Dispense: 15 mL; Refill: 0  Continue to monitor for S/S of hyper hypoglycemia  Will schedule DM education at follow-up on 12/29  With TCC on 12/29 at 1 PM to review BS log for additional changes if needed  Follow-up with endocrinology KIYA Clancy on 1/9-15 p.m. needs follow-up with ENT Dr. Ashley Browne is transitioning his insurance to [facility] and will make appointment at follow-up on 12/29  Needs to establish with PCP within 4 weeks-patient is transitioning his insurance to [address] [place] [address] and will make appointment at follow-up with [facility] PCP  All hospital records, labs, radiology reviewed    2. Primary hypertension  Diagnosis of HTN at time of exam at 148/100  Was rechecked after 30 minutes and did not come down to 135/87  He is to obtain a BP cuff and start checking BP 2 times daily and morning before medications and at least 4 hours after medications and keep log of BP and HR and bring with to all follow-ups for review  Proper technique for checking BP discussed  Printed log for monitoring BP/HR provided to patient  Continue:  Amlodipine 5 mg daily  Increase:  losartan 25 MG Oral Tab; Take 2 tablets (50 mg total) by mouth daily  Discussed goal BP with DMII is 130/80 or less  Continue to monitor for S/S of hyper hypotension. Follow-up with TCC see on 12/29 at 1 PM for BP log review and possible changes    3.  Non-recurrent acute serous otitis media of both ears  Was treated in the hospital with antibiotics and discharged home on oral antibiotics to complete course  Denies any trouble hearing at this time-now reports everything sounds very loud  Reports improving blurry vision-now far away is only blurry  Continue:  Augmentin 875-125 mg 1 tab 2 times daily x 4 days  Will need to follow-up with ENT-will schedule at follow-up on 12/29  Notify TCC if no improvement in symptoms    4. Orchitis  Treated with IV antibiotics in the hospital and transition to oral at discharge  Upon inspection no testicular swelling present  Does report some posterior penile pain with sexual intercourse  Discussed to hold off on sexual intercourse x 1 week to allow for full healing  Continue:  Augmentin 875-125 mg 1 tab 2 times daily x 4 days  Notify TCC if no improvement in symptoms    5. DONALD (acute kidney injury) ([facility] [address]  DONALD secondary to DKA  Kidney function at admission was significantly elevated  BUN 31  Creatinine 3.17  GFR 25  Treated with aggressive IV hydration  Kidney function improved at discharge on 12/20  BUN 8  Creatinine 1.18  GFR 82  Continue to monitor labs as directed    6. Hyperkalemia/hypokalemia  Noted with hyperkalemia at time of admission with potassium of 6.8  Did require intervention  Developed hypokalemia on 12/18 with potassium of 3.4  Potassium even lower on 12/20 at 3.0  Replaced per electrolyte protocol  Potassium stable at discharge at 3.4  Continue to monitor labs as directed    7. Hyponatremia  Secondary to DKA  Sodium significantly low at time of admission at 123  Treated with aggressive IV hydration in the hospital  Sodium at discharge on 12/20 was stable at 138  Continue to monitor labs as directed    8. Tobacco dependence  Smoking cessation discussed  Patient has not restarted smoking since discharge  Reports he was smoking approximately 3 packs/week prior to admission  Was prescribed:  Nicotine 14 mg / 24-hour patches at discharge-has not needed to use  Denies any cravings  Encouraged ongoing cessation    9. Acute cough  Ongoing acute cough that is nonproductive secondary to recent URI as well as potentially quitting smoking  Denies any chest/nasal congestion  Rx sent for:  benzonatate 100 MG Oral Cap; Take 1 capsule (100 mg total) by mouth 3 (three) times daily as needed for cough. Dispense: 21 capsule;  Refill: 0  Notify TCC if no improvement in symptoms    10. Constipation, unspecified constipation type  Reports constipation since discharge with last BM on   Is passing gas without difficulties  Recommended:  MiraLAX 17 g in 8 ounces of fluid daily as needed  Colace 100 mg 1 tab up to 2 times daily as needed  Goal is to have soft formed stool daily per patient's norm  Notify TCC if no improvement in symptoms      Orders Placed This Encounter    Insulin Lispro, 1 Unit Dial, (HUMALOG KWIKPEN) 100 UNIT/ML Subcutaneous Solution Pen-injector     Sig: Test blood glucose 4 times daily before meals Inject 1 unit of insulin for every 20 points blood glucose is greater than 140 mg/dL Inject insulin into the skin prior to meals. Max daily dose 50 units     Dispense:  15 mL     Refill:  0     May substitute if not on insurance formulary    benzonatate 100 MG Oral Cap     Sig: Take 1 capsule (100 mg total) by mouth 3 (three) times daily as needed for cough. Dispense:  21 capsule     Refill:  0    losartan 25 MG Oral Tab     Sig: Take 2 tablets (50 mg total) by mouth daily. Medications & Refills for this Visit:   losartan 25 MG Oral Tab Take 2 tablets (50 mg total) by mouth daily. Insulin Lispro, 1 Unit Dial, (HUMALOG KWIKPEN) 100 UNIT/ML Subcutaneous Solution Pen-injector Test blood glucose 4 times daily before meals Inject 1 unit of insulin for every 20 points blood glucose is greater than 140 mg/dL Inject insulin into the skin prior to meals. Max daily dose 50 units 15 mL 0    benzonatate 100 MG Oral Cap Take 1 capsule (100 mg total) by mouth 3 (three) times daily as needed for cough. 21 capsule 0    insulin detemir (LEVEMIR FLEXTOUCH) 100 UNIT/ML Subcutaneous Solution Pen-injector Inject 35 Units into the skin 2 (two) times daily.  15 mL 0    Glucose Blood (ONETOUCH VERIO) In Vitro Strip Use glucose strips to check glucose 4 times daily 50 strip 6    [] amoxicillin clavulanate 875-125 MG Oral Tab Take 1 tablet by mouth 2 (two) times daily for 4 days. 8 tablet 0    omeprazole 20 MG Oral Capsule Delayed Release Take 1 capsule (20 mg total) by mouth daily. 30 capsule 0    amLODIPine 5 MG Oral Tab Take 1 tablet (5 mg total) by mouth daily. 90 tablet 0     Requested Prescriptions     Signed Prescriptions Disp Refills    Insulin Lispro, 1 Unit Dial, (HUMALOG KWIKPEN) 100 UNIT/ML Subcutaneous Solution Pen-injector 15 mL 0     Sig: Test blood glucose 4 times daily before meals Inject 1 unit of insulin for every 20 points blood glucose is greater than 140 mg/dL Inject insulin into the skin prior to meals. Max daily dose 50 units    benzonatate 100 MG Oral Cap 21 capsule 0     Sig: Take 1 capsule (100 mg total) by mouth 3 (three) times daily as needed for cough.          Health Maintenance:  Health Maintenance   Topic Date Due    Diabetes Care Dilated Eye Exam  Never done    Annual Physical  Never done    COVID-19 Vaccine (1) Never done    Pneumococcal Vaccine: Birth to 64yrs (1 - PCV) Never done    Tobacco Cessation Counseling 1 Year  Never done    DTaP,Tdap,and Td Vaccines (1 - Tdap) Never done    Diabetes Care Foot Exam (Annual)  Never done    Influenza Vaccine (1) Never done    Diabetes Care A1C  06/17/2024    Diabetes Care: Microalb/Creat Ratio  12/18/2024    Diabetes Care: GFR  12/20/2024    Annual Depression Screening  Completed       Chronic Care Management Referral: N/A      Transitional Care Management Certification:  During the visit, the following was completed:  Obtained and reviewed discharge summary, continuity of care documents, Hospitalist notes, Endocrinology Notes, Critical care notes, and discharge information   Reviewed Labs (CBC, CMP), Labs (Cardiac markers), Labs (Microbiology), CT radiology results, US radiology results, X-Ray radiology results, EKG, Hgb A1c, need for follow-up on pending tests, need for follow-up on diagnostic tests, and need for follow-up on treatments    Medication Reconciliation:  I am aware of an inpatient discharge within the last 30 days. The discharge medication list has been reconciled with the patient's current medication list and reviewed by me. See medication list for additions of new medication, and changes to current doses of medications and discontinued medications. Discharge Disposition/Plan:     Future Appointments   Date Time Provider Lebron Beaulieu   12/29/2023  1:00 PM ALCON Ho [facility]   1/9/2024  2:15 PM KIYA [facility] PATRICIA Marx     1. Transitional Care Clinic Visit: Next visit 12/29/2023  2. PCP Visit: To be scheduled when insurance changed  3.   Chronic Care Management: N/A     ALCON Cano, 12/22/2023  Diane Rosales  [phone]

## 2023-12-22 NOTE — PATIENT INSTRUCTIONS
PATIENT INSTRUCTIONS:    Humalog and Novolog insulin are the same insulin (different brand names). Either one can be used for the insulin before meals. Humalog insulin is ready in the [facility] on the first floor. Do not take Humalog or Novolog at bedtime, only before breakfast, lunch and dinner. Blood sugar goal in the morning = 80-130mg/dl, before lunch and dinner <140mg/dl. If blood sugar at bedtime is <100mg/dl take Levemir and eat a snack with 15gm of carbohydrates (see attached information for types of foods with 15gm of carbohydrates.   Goals discussed:  A1c of 6.7-7.0%  Fasting BS of   Premeal  or less  2 hours postprandial  or less  Diabetic preventative care discussed:  Yearly diabetic dilated eye exam- monitor for retinopathy  Dental exams every 6 months  Yearly urine for microalbumin-May need ACE/ARB in the future  Will need statin addition  Daily thorough foot exams-tabs, bottoms, in between toes-use a mirror-monitor for any wounds, cuts, calluses and report immediately  Do not cut anything off feet  Toenail care- cut toenails straight across and filed down sides to reduce the risk of ingrown toenails  All ingrown toenails to be removed by podiatry  For gas use  Simethicone or Gas-X as directed on box  For constipation use  MiraLAX 17 g in 8 ounces of water up to 2 times daily  If stool is hard use Colace 100 mg up to 2 times daily

## 2023-12-22 NOTE — PROGRESS NOTES
TRANSITIONAL CARE CLINIC PHARMACIST MEDICATION RECONCILIATION        Morris Cisse MRN [id number]    1985 PCP No primary care provider on file. Comments: Medication history completed by the [address] [place] [address] Pharmacist with the patient and spouse in the office. The following medication changes occurred while patient was hospitalized:  Medications Started:  Amlodipine 5mg tabs - 1 tablet daily  Amoxicillin-clavulanate 875-125mg tabs - 1 tablet two times daily (therapy to complete on 23)  Insulin Lispro 100unit/ml soln - Test blood sugar 4 times daily before meals and at bedtime. Inject 1 unit for every 20 points blood glucose greater than 140mg/dl. Levemir FlexTouch 100uni/ml soln - Inject 35 units into the skin two times daily  Losartan 25mg tabs - 1 tablet daily  Nicotine 14mg/24 hr patch - Place 1 patch onto the skin daily  Omeprazole 20mg caps - 1 capsule daily  One Touch Verio Flex meter and supplies - Use to test blood sugars four times daily    Outpatient Encounter Medications as of 2023   Medication Sig    Insulin Lispro, 1 Unit Dial, (HUMALOG KWIKPEN) 100 UNIT/ML Subcutaneous Solution Pen-injector Test blood glucose 4 times daily before meals Inject 1 unit of insulin for every 20 points blood glucose is greater than 140 mg/dL Inject insulin into the skin prior to meals. Max daily dose 50 units    benzonatate 100 MG Oral Cap Take 1 capsule (100 mg total) by mouth 3 (three) times daily as needed for cough. losartan 25 MG Oral Tab Take 1 tablet (25 mg total) by mouth daily. insulin detemir (LEVEMIR FLEXTOUCH) 100 UNIT/ML Subcutaneous Solution Pen-injector Inject 35 Units into the skin 2 (two) times daily. Glucose Blood (ONETOUCH VERIO) In Vitro Strip Use glucose strips to check glucose 4 times daily    amoxicillin clavulanate 875-125 MG Oral Tab Take 1 tablet by mouth 2 (two) times daily for 4 days.     omeprazole 20 MG Oral Capsule Delayed Release Take 1 capsule (20 mg total) by mouth daily. amLODIPine 5 MG Oral Tab Take 1 tablet (5 mg total) by mouth daily. Medication Adherence Assessment:   Patient reports taking all new medications as prescribed. Denies any upset stomach, diarrhea, lightheadedness or dizziness since discharge. Patient  reports he has not picked up a BP cuff yet, but will get one today to start monitoring his blood pressure. Blood pressure in the office was 148/100 initially, with 135/87 on recheck. Recommended increase to Losartan 50mg daily. Patient to start keeping a blood pressure log and bring to our appointment next week. Patient reports injecting insulin as prescribed. States the Humalog dose is based on the sliding scale only and not adding 8 units to that dose. Patient states they were not told to add the 8 units. Blood sugars are as follows:  12/20: HS-219  12/21: AM-124, afternoon-422, Dinner-168, HS-107  12/22: AM-152  Patient did have some [place] burgers and fish nuggets for lunch on the 21st, which is when the 422 reading was done. Discussed with the patient some basics of foods that can raise the blood sugars and need to eaten in moderation. Patient will need to be set up with the dietitian. Reviewed all medications in detail with patient including dose, indication, timing of administration, monitoring parameters, and potential side effects of medications. Patient confirmed understanding.      Thank you,    Dandre Vela, PharmD, 12/22/2023, 2:43 PM  [facility]

## 2023-12-28 LAB — IA-2 AUTOABS: <7.5 U/ML

## 2023-12-28 RX ORDER — LOSARTAN POTASSIUM 25 MG/1
50 TABLET ORAL DAILY
COMMUNITY
Start: 2023-12-22

## 2023-12-31 PROBLEM — K59.00 CONSTIPATION: Status: ACTIVE | Noted: 2023-12-31

## 2023-12-31 PROBLEM — R05.1 ACUTE COUGH: Status: ACTIVE | Noted: 2023-12-31

## 2024-01-02 ENCOUNTER — OFFICE VISIT (OUTPATIENT)
Dept: INTERNAL MEDICINE CLINIC | Facility: CLINIC | Age: 39
End: 2024-01-02
Payer: MEDICAID

## 2024-01-02 VITALS
BODY MASS INDEX: 41.75 KG/M2 | SYSTOLIC BLOOD PRESSURE: 128 MMHG | HEIGHT: 73 IN | DIASTOLIC BLOOD PRESSURE: 88 MMHG | RESPIRATION RATE: 20 BRPM | OXYGEN SATURATION: 99 % | HEART RATE: 81 BPM | TEMPERATURE: 97 F | WEIGHT: 315 LBS

## 2024-01-02 DIAGNOSIS — I10 PRIMARY HYPERTENSION: ICD-10-CM

## 2024-01-02 DIAGNOSIS — E11.00 TYPE 2 DIABETES MELLITUS WITH HYPEROSMOLARITY WITHOUT COMA, WITHOUT LONG-TERM CURRENT USE OF INSULIN (HCC): Primary | ICD-10-CM

## 2024-01-02 PROCEDURE — 3079F DIAST BP 80-89 MM HG: CPT | Performed by: NURSE PRACTITIONER

## 2024-01-02 PROCEDURE — 3008F BODY MASS INDEX DOCD: CPT | Performed by: NURSE PRACTITIONER

## 2024-01-02 PROCEDURE — 99214 OFFICE O/P EST MOD 30 MIN: CPT | Performed by: NURSE PRACTITIONER

## 2024-01-02 PROCEDURE — 3074F SYST BP LT 130 MM HG: CPT | Performed by: NURSE PRACTITIONER

## 2024-01-02 RX ORDER — AMLODIPINE BESYLATE 10 MG/1
10 TABLET ORAL DAILY
Qty: 30 TABLET | Refills: 0 | Status: SHIPPED | OUTPATIENT
Start: 2024-01-02 | End: 2024-02-01

## 2024-01-02 RX ORDER — INSULIN DETEMIR 100 [IU]/ML
28 INJECTION, SOLUTION SUBCUTANEOUS 2 TIMES DAILY
Qty: 15 ML | Refills: 0 | Status: SHIPPED | OUTPATIENT
Start: 2024-01-02

## 2024-01-02 RX ORDER — AMLODIPINE BESYLATE 10 MG/1
10 TABLET ORAL DAILY
Qty: 30 TABLET | Refills: 0 | Status: SHIPPED | OUTPATIENT
Start: 2024-01-02 | End: 2024-01-02

## 2024-01-02 RX ORDER — INSULIN DETEMIR 100 [IU]/ML
28 INJECTION, SOLUTION SUBCUTANEOUS 2 TIMES DAILY
Qty: 15 ML | Refills: 0 | Status: SHIPPED | OUTPATIENT
Start: 2024-01-02 | End: 2024-01-02

## 2024-01-07 PROBLEM — E11.00 TYPE 2 DIABETES MELLITUS WITH HYPEROSMOLARITY WITHOUT COMA, WITHOUT LONG-TERM CURRENT USE OF INSULIN (HCC): Status: ACTIVE | Noted: 2024-01-07

## 2024-01-08 NOTE — PROGRESS NOTES
TRANSITIONAL CARE CLINIC FOLLOW-UP VISIT     Yefri Brothers MRN [id number]    1985 PCP No primary care provider on file.   Admit Date: 23  Discharge Date: 23  Hospital Discharge Diagnosis:     #HHS/DKA  #Uncontrolled DM2 A1c >15  #DONALD, resolved  #B/L otitis media, hearing loss improved  #Possible Left orchitis  #Hyperkalemia with abnormal EKG, resolved  #Hypokalemia, improved  #Acidosis, resolved  #HTN       CHIEF COMPLAINT: Patient is for follow-up for uncontrolled HTN and uncontrolled DMII.     HPI: Today patient is being seen as a follow-up to review BP and BS logs.  Patient reports doing good since last visit.  He has been monitoring his BS 3 times daily and brought log for review today.  He is currently on lispro with sliding scale and Levemir 35 units twice daily.  His BS in the morning is running  with hypoglycemic reading of 67 and before lunch is running  with 2 high readings on  at 218 and  at 279, dinner is running .  He has only had to use lispro 2 times since last visit.  He is to continue lispro per sliding scale before meals and will decrease Levemir to 28 units twice daily.  Patient is to continue to check BS 3 times daily before meals and keep log and bring with to follow-up on  for further review, and does have endocrinology APN Alexa Thomas also on  at 2:15 PM.    He also has been monitoring his BP 2 times daily and morning before medications and at least 4 hours after medications and has brought log for review today.  He is currently taking losartan 50 mg/day and amlodipine 5 mg daily.  His BP in the morning is running 139-169/ with HR between 70 to-89 and 4 hours after meds BP is running 152-166/ with HR running 82-85.  Discussed his BP is still higher than recommended for patient with DMII.  Discussed goal BP would be 130/80 or less especially with diagnosis of DMII.  Patient and wife are agreeable and he is to continue  losartan 50 mg/day and increase amlodipine to 10 mg/day.  He is to continue to monitor his BP 2 times daily and morning before meds and at least 4 hours after meds and keep a log of BP and HR and follow-up on 1/9 for further review and possible dose changes.  His wife reports they have reached out to Dr. Felix's office and he is to make appointment as soon as he is discharged from [facility] [place] [facility].  Patient and wife had no other questions or concerns at time of exam.    Current Meds:  Current Outpatient Medications   Medication Sig Dispense Refill    amLODIPine 10 MG Oral Tab Take 1 tablet (10 mg total) by mouth daily. 30 tablet 0    insulin detemir (LEVEMIR FLEXTOUCH) 100 UNIT/ML Subcutaneous Solution Pen-injector Inject 28 Units into the skin 2 (two) times daily. 15 mL 0    losartan 25 MG Oral Tab Take 2 tablets (50 mg total) by mouth daily.      Insulin Lispro, 1 Unit Dial, (HUMALOG KWIKPEN) 100 UNIT/ML Subcutaneous Solution Pen-injector Test blood glucose 4 times daily before meals Inject 1 unit of insulin for every 20 points blood glucose is greater than 140 mg/dL Inject insulin into the skin prior to meals. Max daily dose 50 units 15 mL 0    Glucose Blood (ONETOUCH VERIO) In Vitro Strip Use glucose strips to check glucose 4 times daily 50 strip 6    omeprazole 20 MG Oral Capsule Delayed Release Take 1 capsule (20 mg total) by mouth daily. 30 capsule 0    benzonatate 100 MG Oral Cap Take 1 capsule (100 mg total) by mouth 3 (three) times daily as needed for cough. (Patient not taking: Reported on 1/2/2024) 21 capsule 0     No current facility-administered medications for this visit.        Lab Results   Component Value Date/Time    WBC 5.4 12/19/2023 05:36 AM    HGB 13.6 12/19/2023 05:36 AM    HCT 40.6 12/19/2023 05:36 AM    .0 12/19/2023 05:36 AM     (H) 12/20/2023 05:44 AM     12/20/2023 05:44 AM    K 3.4 (L) 12/20/2023 03:21 PM     12/20/2023 05:44 AM    CO2 21.0 12/20/2023 05:44 AM    BUN 8 (L)  12/20/2023 05:44 AM    CREATSERUM 1.18 12/20/2023 05:44 AM    CA 7.6 (L) 12/20/2023 05:44 AM    MG 2.6 12/18/2023 04:40 AM    PHOS 3.1 12/20/2023 05:44 AM    ALB 4.5 12/17/2023 02:11 PM    ALT 28 12/17/2023 02:11 PM    AST 9 (L) 12/17/2023 02:11 PM    A1C  12/17/2023 02:11 PM      Comment:      Hemoglobin A1C to be confirmed at [facility]    A1C >15.5 (H) 12/17/2023 02:11 PM       ROS:  GENERAL: energy fair/stable, denies fever, weakness  SKIN: denies any skin changes  EYES: + improving blurred vision, denies eye pain  HEENT: denies ear pain, loss of hearing, sore throat, + reports sounds are louder now  RESPIRATORY: denies cough, denies dyspnea with exertion  CARDIOVASCULAR: denies syncope, chest pain, fatigue, palpitations   GI: denies abdominal pain, melena, constipation, denies diarrhea, nausea, vomiting   : No further testicular swelling  MUSCULOSKELETAL: denies pain, states normal range of motion of extremities  NEUROLOGIC: denies confusion, balance difficulty  PSYCHIATRIC: denies depression or anxiety  HEMATOLOGIC: denies history of anemia, bruising, bleeding    PHYSICAL EXAM:  /88   Pulse 81   Temp 97.3 °F (36.3 °C) (Temporal)   Resp 20   Ht 6' 1\" (1.854 m)   Wt (!) 332 lb (150.6 kg)   SpO2 99%   BMI 43.80 kg/m²    GENERAL: well developed, well nourished, in no apparent distress, patient and wife are good historians  INTEGUMENTARY: warm, dry, no rashes  HEENT: atraumatic, normocephalic, sclera white, conjunctivae pink  NECK: supple  CHEST: no chest tenderness  LUNGS: clear to auscultation bilaterally, no wheezes, rhonchi or rales, normal respiratory effort  CARDIO: S1, S2, RRR without audible murmur  GI: + BS to all quadrants, no tenderness  : No noted testicular swelling  MUSCULOSKELETAL: + ROM in all joints, no evidence of swelling, pain   EXTREMITIES: no cyanosis, or edema  NEURO: Oriented x3  PSYCHIATRIC: appropriate affect      IMPRESSION/ PLAN:   Diagnoses and all orders for this  visit:    Type 2 diabetes mellitus with hyperosmolarity without coma, without long-term current use of insulin ([facility])  Glucose at admission was significantly elevated at 961  AG was 24  Found to have DKA/HHS with new onset DMII  A1c was found to be > 15.5%  MIRIAN labs were negative  Admitted to ICU on DKA protocol and treated with aggressive IVF, insulin drip, electrolyte correction, and antiemetics as needed  He should continue to check BS 3 times daily before meals and should continue and keep log and bring with to follow-up on 1/9 for review  BS this morning was 91  Reviewed BS log since last visit and BS in the morning is running  with 1 hypoglycemic reading of 67 and before lunch is running  with 2 high readings on 12/22 at 218 and 12/25 at 279, dinner is running   Discussed if BS is 100 or less in the evening he is to give himself his Levemir and have a bedtime snack and discontinue bedtime dose of short acting insulin  All polys have resolved  Tolerating an oral diet  Appetite is good/drinking well  Moving bowels/passing gas  Printed log for monitoring BS given to patient  Decrease:  Levemir to 28 units twice daily  Continue:  NovoLog insulin ISF of 1: 20 >140-he has only needed to use short acting twice since last visit  Losartan 50 mg daily  Omeprazole 20 mg daily  Goals discussed-reinforced:  A1c of 6.7-7.0%  Fasting BS of   Premeal  or less  2 hours postprandial  or less  Diabetic preventative care discussed:-Reinforced  Yearly diabetic dilated eye exam- monitor for retinopathy  Dental exams every 6 months  Yearly urine for microalbumin-May need ACE/ARB in the future-losartan  Will need statin addition  Daily thorough foot exams-tabs, bottoms, in between toes-use a mirror-monitor for any wounds, cuts, calluses and report immediately  Do not cut anything off feet  Toenail care- cut toenails straight across and filed down sides to reduce the risk of ingrown toenails  All  ingrown toenails to be removed by podiatry  Continue to monitor for S/S of hyper hypoglycemia  Will schedule DM education when he receives his [employer] community card  Follow-up with [facility] on 1/9 at 1 PM with BS log for additional changes if needed  Follow-up with endocrinology KIYA Phipps on 1/9 2:15 p.m.   Needs follow-up with ENT Dr. Miramontes-patient is transitioning his insurance to [facility] and will make appointment at follow-up on 1/9  Needs to establish with PCP within 4 weeks-patient is transitioning his insurance to [facility] and will make appointment at follow-up with [facility] PCP once discharged from [facility]-Dr. Felix    Primary hypertension  BP was elevated upon arrival at 158/106  Rechecked after 30 minutes and BP came down to 128/88  He is to continue to monitor BP 2 times daily and morning before medications and at least 4 hours after medications and keep log of BP and HR and bring with to all follow-ups for review and on 1/9 to [facility] follow-up for review  Proper technique for checking BP discussed  Printed log for monitoring BP/HR provided to patient  BP log reviewed and running 139-169/ with HR between 70 to-89 and 4 hours after meds BP is running 152-166/ with HR running 82-85  Increase:  Amlodipine 10 mg daily  Continue:  Losartan 50 mg daily  Discussed goal BP with DMII is 130/80 or less  Continue to monitor for S/S of hyper hypotension.  Follow-up with [facility] see on 1/9 at 1 PM to review BP log and make changes as needed    Orders Placed This Encounter    DISCONTD: amLODIPine 10 MG Oral Tab     Sig: Take 1 tablet (10 mg total) by mouth daily.     Dispense:  30 tablet     Refill:  0    DISCONTD: insulin detemir (LEVEMIR FLEXTOUCH) 100 UNIT/ML Subcutaneous Solution Pen-injector     Sig: Inject 28 Units into the skin 2 (two) times daily.     Dispense:  15 mL     Refill:  0     May substitute if not on insurance formulary    amLODIPine 10 MG Oral Tab     Sig: Take 1 tablet (10 mg total) by  mouth daily.     Dispense:  30 tablet     Refill:  0    insulin detemir (LEVEMIR FLEXTOUCH) 100 UNIT/ML Subcutaneous Solution Pen-injector     Sig: Inject 28 Units into the skin 2 (two) times daily.     Dispense:  15 mL     Refill:  0     May substitute if not on insurance formulary       Vaccines: N/A    Current Meds & Refills for this Visit:  Current Meds:  Current Outpatient Medications on File Prior to Visit   Medication Sig    losartan 25 MG Oral Tab Take 2 tablets (50 mg total) by mouth daily.    Insulin Lispro, 1 Unit Dial, (HUMALOG KWIKPEN) 100 UNIT/ML Subcutaneous Solution Pen-injector Test blood glucose 4 times daily before meals Inject 1 unit of insulin for every 20 points blood glucose is greater than 140 mg/dL Inject insulin into the skin prior to meals. Max daily dose 50 units    Glucose Blood (ONETOUCH VERIO) In Vitro Strip Use glucose strips to check glucose 4 times daily    omeprazole 20 MG Oral Capsule Delayed Release Take 1 capsule (20 mg total) by mouth daily.    benzonatate 100 MG Oral Cap Take 1 capsule (100 mg total) by mouth 3 (three) times daily as needed for cough. (Patient not taking: Reported on 1/2/2024)     No current facility-administered medications on file prior to visit.         Refills:  Requested Prescriptions     Signed Prescriptions Disp Refills    amLODIPine 10 MG Oral Tab 30 tablet 0     Sig: Take 1 tablet (10 mg total) by mouth daily.    insulin detemir (LEVEMIR FLEXTOUCH) 100 UNIT/ML Subcutaneous Solution Pen-injector 15 mL 0     Sig: Inject 28 Units into the skin 2 (two) times daily.         Chronic Care Management Referral: N/A    Future Appointments   Date Time Provider Department Center   1/9/2024  1:00 PM Savanah Curran APRN [facility]   1/9/2024  2:15 PM Alexa Thomas APN [facility]     Discharge Disposition:  1.  [facility] Follow-Up Visit: 1/9/2024  2.  Scheduled PCP Visit: Dr. Felix once discharged from [facility]   3.  Home Health Care: N/A      Savanah  ALCON Curran, 1/3/2024   [facility]  [phone]

## 2024-01-11 ENCOUNTER — OFFICE VISIT (OUTPATIENT)
Dept: INTERNAL MEDICINE CLINIC | Facility: CLINIC | Age: 39
End: 2024-01-11
Payer: MEDICAID

## 2024-01-11 VITALS
TEMPERATURE: 97 F | WEIGHT: 315 LBS | DIASTOLIC BLOOD PRESSURE: 91 MMHG | HEART RATE: 79 BPM | HEIGHT: 73 IN | SYSTOLIC BLOOD PRESSURE: 138 MMHG | BODY MASS INDEX: 41.75 KG/M2 | OXYGEN SATURATION: 99 % | RESPIRATION RATE: 18 BRPM

## 2024-01-11 DIAGNOSIS — I10 PRIMARY HYPERTENSION: ICD-10-CM

## 2024-01-11 DIAGNOSIS — Z79.4 INSULIN DEPENDENT TYPE 2 DIABETES MELLITUS, CONTROLLED (HCC): ICD-10-CM

## 2024-01-11 DIAGNOSIS — E11.9 INSULIN DEPENDENT TYPE 2 DIABETES MELLITUS, CONTROLLED (HCC): ICD-10-CM

## 2024-01-11 DIAGNOSIS — E11.9 NEW ONSET TYPE 2 DIABETES MELLITUS (HCC): Primary | ICD-10-CM

## 2024-01-11 PROCEDURE — 99214 OFFICE O/P EST MOD 30 MIN: CPT | Performed by: NURSE PRACTITIONER

## 2024-01-11 RX ORDER — BLOOD SUGAR DIAGNOSTIC
STRIP MISCELLANEOUS
Qty: 100 STRIP | Refills: 0 | Status: SHIPPED | OUTPATIENT
Start: 2024-01-11

## 2024-01-11 NOTE — PROGRESS NOTES
TRANSITIONAL CARE CLINIC FOLLOW-UP VISIT     Yefri Brothers MRN [id number]    1985 PCP No primary care provider on file.   Admit Date: 23  Discharge Date: 23  Hospital Discharge Diagnosis:      #HHS/DKA  #Uncontrolled DM2 A1c >15  #DONALD, resolved  #B/L otitis media, hearing loss improved  #Possible Left orchitis  #Hyperkalemia with abnormal EKG, resolved  #Hypokalemia, improved  #Acidosis, resolved  #HTN       CHIEF COMPLAINT: [id number] for uncontrolled DMII new onset and uncontrolled HTN.     HPI: Today patient is being seen as a follow-up to review BP and BS logs. Patient reports doing good since last visit.  He has been monitoring his BS 3 times daily and brought log for review today.  He is currently on lispro with sliding scale 1:20 >140 and Levemir 28 units twice daily.  His BS in the morning is running  with 1 elevated reading of 265, and before lunch is running-only has 3 readings of 149, 214, 223, dinner is running 85-96 with 1 high reading of 188.  He also has late night readings as he is awake later into the evening and sleeps later in the morning  with 1 reading of hypoglycemia on  at 53 in which he reports not eating much for lunch or dinner.  He was instructed to purchase glucose tabs or glucose gel to have 4 episodes of hypoglycemia.  He is to continue lispro per sliding scale before meals 1:20 >140 and will continue Levemir at 28 units twice daily.  Patient is to continue to check BS 3 times daily before meals and keep log and bring with to follow-up with endocrinology on  further review, and titration as needed.    He was supposed to be monitoring his BP 2 times daily and morning before medications and at least 4 hours after medications and provide log today to review.  Per patient and his wife they have not been able to check BP and have no log today.  He is to continue amlodipine 10 mg daily and losartan 50 mg daily.  He is to begin just to check BP  again twice daily as directed and [facility] office will follow-up with him via telephone call in 1 week on 1/18 to obtain BP log and make changes as needed.  His BP today in office is stable at 138/91.  He and wife have no further questions at today's exam.    Current Meds:  Current Outpatient Medications   Medication Sig Dispense Refill    Glucose Blood (ONETOUCH VERIO) In Vitro Strip Use glucose strips to check glucose 4 times daily 100 strip 0    amLODIPine 10 MG Oral Tab Take 1 tablet (10 mg total) by mouth daily. 30 tablet 0    insulin detemir (LEVEMIR FLEXTOUCH) 100 UNIT/ML Subcutaneous Solution Pen-injector Inject 28 Units into the skin 2 (two) times daily. 15 mL 0    losartan 25 MG Oral Tab Take 2 tablets (50 mg total) by mouth daily.      Insulin Lispro, 1 Unit Dial, (HUMALOG KWIKPEN) 100 UNIT/ML Subcutaneous Solution Pen-injector Test blood glucose 4 times daily before meals Inject 1 unit of insulin for every 20 points blood glucose is greater than 140 mg/dL Inject insulin into the skin prior to meals. Max daily dose 50 units 15 mL 0    benzonatate 100 MG Oral Cap Take 1 capsule (100 mg total) by mouth 3 (three) times daily as needed for cough. (Patient not taking: Reported on 1/2/2024) 21 capsule 0    omeprazole 20 MG Oral Capsule Delayed Release Take 1 capsule (20 mg total) by mouth daily. 30 capsule 0     No current facility-administered medications for this visit.        Lab Results   Component Value Date/Time    WBC 5.4 12/19/2023 05:36 AM    HGB 13.6 12/19/2023 05:36 AM    HCT 40.6 12/19/2023 05:36 AM    .0 12/19/2023 05:36 AM     (H) 12/20/2023 05:44 AM     12/20/2023 05:44 AM    K 3.4 (L) 12/20/2023 03:21 PM     12/20/2023 05:44 AM    CO2 21.0 12/20/2023 05:44 AM    BUN 8 (L) 12/20/2023 05:44 AM    CREATSERUM 1.18 12/20/2023 05:44 AM    CA 7.6 (L) 12/20/2023 05:44 AM    MG 2.6 12/18/2023 04:40 AM    PHOS 3.1 12/20/2023 05:44 AM    ALB 4.5 12/17/2023 02:11 PM    ALT 28 12/17/2023  02:11 PM    AST 9 (L) 12/17/2023 02:11 PM    A1C  12/17/2023 02:11 PM      Comment:      Hemoglobin A1C to be confirmed at [facility]    A1C >15.5 (H) 12/17/2023 02:11 PM       ROS:  GENERAL: energy fair/stable, denies fever, weakness  SKIN: denies any skin changes  EYES: + improving blurred vision, denies eye pain  HEENT: denies ear pain, loss of hearing, sore throat, + reports sounds are louder now  RESPIRATORY: denies cough, denies dyspnea with exertion  CARDIOVASCULAR: denies syncope, chest pain, fatigue, palpitations   GI: denies abdominal pain, melena, constipation, denies diarrhea, nausea, vomiting   : No further testicular swelling  MUSCULOSKELETAL: denies pain, states normal range of motion of extremities  NEUROLOGIC: denies confusion, balance difficulty  PSYCHIATRIC: denies depression or anxiety  HEMATOLOGIC: denies history of anemia, bruising, bleeding    PHYSICAL EXAM:  BP (!) 138/91   Pulse 79   Temp 97.3 °F (36.3 °C) (Temporal)   Resp 18   Ht 6' 1\" (1.854 m)   Wt (!) 339 lb (153.8 kg)   SpO2 99%   BMI 44.73 kg/m²    GENERAL: well developed, well nourished, in no apparent distress, patient and wife are good historians  INTEGUMENTARY: warm, dry, no rashes  HEENT: atraumatic, normocephalic, sclera white, conjunctivae pink  NECK: supple  CHEST: no chest tenderness  LUNGS: clear to auscultation bilaterally, no wheezes, rhonchi or rales, normal respiratory effort  CARDIO: S1, S2, RRR without audible murmur  GI: + BS to all quadrants, no tenderness  : No noted testicular swelling  MUSCULOSKELETAL: + ROM in all joints, no evidence of swelling, pain   EXTREMITIES: no cyanosis, or edema  NEURO: Oriented x3  PSYCHIATRIC: appropriate affect      IMPRESSION/ PLAN:   Diagnoses and all orders for this visit:    New onset type 2 diabetes mellitus (HCC)  Glucose at admission was significantly elevated at 961  AG was 24  Found to have DKA/HHS with new onset DMII  A1c was found to be > 15.5%  MIRIAN labs were  negative  Admitted to ICU on DKA protocol and treated with aggressive IVF, insulin drip, electrolyte correction, and antiemetics as needed  He should continue to check BS 3 times daily before meals and should continue and keep log and bring with to follow-up on 1/9 for review  BS this morning was 91  Reviewed BS log since last visit and BS in the morning is running  with 1 elevated reading of 265, and before lunch is running-only has 3 readings of 149, 214, 223, dinner is running 85-96 with 1 high reading of 188  He also has late night readings as he is awake later into the evening and sleeps later in the morning  with 1 reading of hypoglycemia on 1/9 of 53 in which he reports not eating much for lunch or dinner.  Recommended:  Glucose tabs/glucose gel to have in case of hypoglycemia  Discussed if BS is 100 or less in the evening he is to give himself his Levemir and have a bedtime snack and discontinue bedtime dose of short acting insulin  All polys have resolved  Tolerating an oral diet  Appetite is good/drinking well  Moving bowels/passing gas  Printed log for monitoring BS given to patient  Continue:  Levemir to 28 units twice daily  NovoLog insulin ISF of 1: 20 >140  Losartan 50 mg daily  Omeprazole 20 mg daily  Goals discussed-reinforced:  A1c of 6.7-7.0%  Fasting BS of   Premeal  or less  2 hours postprandial  or less  Diabetic preventative care discussed:-Reinforced  Yearly diabetic dilated eye exam- monitor for retinopathy  Dental exams every 6 months  Yearly urine for microalbumin-May need ACE/ARB in the future-losartan  Will need statin addition  Daily thorough foot exams-tabs, bottoms, in between toes-use a mirror-monitor for any wounds, cuts, calluses and report immediately  Do not cut anything off feet  Toenail care- cut toenails straight across and filed down sides to reduce the risk of ingrown toenails  All ingrown toenails to be removed by podiatry  Continue to monitor  for S/S of hyper hypoglycemia  Will schedule DM education when he receives his [employer] community card  Follow-up with [facility] on 1/18 via telephone for BP log  Follow-up with endocrinology KIYA Phipps on 1/18  Needs follow-up with ENT Dr. Miramontes-patient is transitioning his insurance to [facility] and will make appointment 1 receives card  Needs to establish with PCP within 4 weeks-patient is transitioning his insurance to [facility] and will make appointment at follow-up with [facility] PCP once discharged from [facility]-Dr. Felix  Rx sent for  Glucose Blood (ONETOUCH VERIO) In Vitro Strip; Use glucose strips to check glucose 4 times daily  Endocrinology to follow ongoing for BS management    Primary hypertension  BP was stable at today's exam at 138/91  He did not bring BP log with for review at today's exam  He is to continue to monitor BP 2 times daily and morning before medications and at least 4 hours after medications and keep log of BP and HR and bring with to all follow-ups for review and on 118 via phone to [facility] follow-up for review  Proper technique for checking BP discussed  Printed log for monitoring BP/HR provided to patient  BP log reviewed and running 139-169/ with HR between 70 to-89 and 4 hours after meds BP is running 152-166/ with HR running 82-85-on last visit  Continue:  Amlodipine 10 mg daily  Losartan 50 mg daily  Discussed goal BP with DMII is 130/80 or less  Continue to monitor for S/S of hyper hypotension.  Follow-up with [facility] see on 1/18 via phone to review BP log and make changes as needed  Continue to monitor for S/S of hyper hypotension      Orders Placed This Encounter    Glucose Blood (ONETOUCH VERIO) In Vitro Strip     Sig: Use glucose strips to check glucose 4 times daily     Dispense:  100 strip     Refill:  0     May substitute if not on insurance formulary     .  Vaccines: N/A    Current Meds & Refills for this Visit:  Current Meds:  Current Outpatient Medications on File  Prior to Visit   Medication Sig    amLODIPine 10 MG Oral Tab Take 1 tablet (10 mg total) by mouth daily.    insulin detemir (LEVEMIR FLEXTOUCH) 100 UNIT/ML Subcutaneous Solution Pen-injector Inject 28 Units into the skin 2 (two) times daily.    Insulin Lispro, 1 Unit Dial, (HUMALOG KWIKPEN) 100 UNIT/ML Subcutaneous Solution Pen-injector Test blood glucose 4 times daily before meals Inject 1 unit of insulin for every 20 points blood glucose is greater than 140 mg/dL Inject insulin into the skin prior to meals. Max daily dose 50 units    benzonatate 100 MG Oral Cap Take 1 capsule (100 mg total) by mouth 3 (three) times daily as needed for cough. (Patient not taking: Reported on 1/2/2024)    omeprazole 20 MG Oral Capsule Delayed Release Take 1 capsule (20 mg total) by mouth daily.     No current facility-administered medications on file prior to visit.         Refills:  Requested Prescriptions     Signed Prescriptions Disp Refills    Glucose Blood (ONETOUCH VERIO) In Vitro Strip 100 strip 0     Sig: Use glucose strips to check glucose 4 times daily         Chronic Care Management Referral: N/A    Future Appointments   Date Time Provider Department Center   1/18/2024  2:45 PM Alexa Thomas APN [facility]     Discharge Disposition:  1.  [facility] Follow-Up Visit: 1/18/2024 via telephone call  2.  Scheduled PCP Visit: To be scheduled  3.  Home Health Care: N/A      ALCON Gutierrez, 1/11/2024   [facility]  [phone]

## 2024-01-18 ENCOUNTER — TELEPHONE (OUTPATIENT)
Dept: INTERNAL MEDICINE CLINIC | Facility: CLINIC | Age: 39
End: 2024-01-18

## 2024-01-18 ENCOUNTER — OFFICE VISIT (OUTPATIENT)
Facility: CLINIC | Age: 39
End: 2024-01-18
Payer: MEDICAID

## 2024-01-18 VITALS
SYSTOLIC BLOOD PRESSURE: 132 MMHG | HEART RATE: 95 BPM | DIASTOLIC BLOOD PRESSURE: 86 MMHG | WEIGHT: 315 LBS | OXYGEN SATURATION: 97 % | BODY MASS INDEX: 45 KG/M2

## 2024-01-18 DIAGNOSIS — R53.82 CHRONIC FATIGUE: ICD-10-CM

## 2024-01-18 DIAGNOSIS — E11.9 NEW ONSET TYPE 2 DIABETES MELLITUS (HCC): Primary | ICD-10-CM

## 2024-01-18 DIAGNOSIS — I10 PRIMARY HYPERTENSION: Primary | ICD-10-CM

## 2024-01-18 PROCEDURE — 3079F DIAST BP 80-89 MM HG: CPT

## 2024-01-18 PROCEDURE — 3075F SYST BP GE 130 - 139MM HG: CPT

## 2024-01-18 PROCEDURE — 99214 OFFICE O/P EST MOD 30 MIN: CPT

## 2024-01-18 RX ORDER — LOSARTAN POTASSIUM 100 MG/1
100 TABLET ORAL DAILY
Qty: 30 TABLET | Refills: 0 | Status: SHIPPED | OUTPATIENT
Start: 2024-01-18

## 2024-01-18 RX ORDER — ACYCLOVIR 400 MG/1
1 TABLET ORAL
Qty: 9 EACH | Refills: 1 | Status: SHIPPED | OUTPATIENT
Start: 2024-01-18

## 2024-01-18 RX ORDER — DULAGLUTIDE 0.75 MG/.5ML
0.75 INJECTION, SOLUTION SUBCUTANEOUS WEEKLY
Qty: 2 ML | Refills: 0 | Status: CANCELLED | OUTPATIENT
Start: 2024-01-18

## 2024-01-18 RX ORDER — METFORMIN HYDROCHLORIDE 500 MG/1
1000 TABLET, EXTENDED RELEASE ORAL 2 TIMES DAILY WITH MEALS
Qty: 360 TABLET | Refills: 1 | Status: SHIPPED | OUTPATIENT
Start: 2024-01-18

## 2024-01-18 NOTE — TELEPHONE ENCOUNTER
Called pt for BP readings    1/12 none   1/13 152/96   1/14 152/108   1/15 none   1/16 142/101   1/17 152/102   1/18 140/102       Please advise

## 2024-01-18 NOTE — TELEPHONE ENCOUNTER
Patient calling - verified patient's name and  - informed patient of doctor's note - patient verbalized understanding and intent to comply

## 2024-01-18 NOTE — PATIENT INSTRUCTIONS
Plan:  - Start metformin with goal of weaning insulin  - next month will start you on trulicity/rybelsus  - meet DM educator to talk about dietary changes  - establish care with primary care provider - Dr. Jay Espinoza in [place], Dr. Clara Muir M.D.-- recommend call this office and which primary care is taking new patients: [phone], THEN switch it with your medicaid to ensure they're taking patients  - complete your diabetes eye exam. This exam is critical to preventing and treating eye conditions caused by diabetes that could potentially lead to vision loss. Once complete, please call your eye care provider and ask them to fax your latest report to our office. This will help us update our records.   Our fax number is: [phone] .   If you have Medicaid call to schedule with [facility]- [phone] -- you want to see an ophthalmologist to get your diabetes eye exam      Medications:   - Start metformin- we will start with 500mg once daily and slowly increase over four weeks to the goal of 1000mg twice daily. They are large tablets. It is best to take them with food to reduce unwanted GI side effects. We are going to start this slow and increase it, so you do not have GI side effects. The most common side effect is diarrhea, but when increased slowly usually it resolves within 2-3 days.     Metformin dosing:   Week 1: Metformin 500m tab daily in morning with food  Week 2: Metformin 500mg  1 tabs in AM, 1 tab in PM with food  Week 3: Metformin 500m tabs in AM, 1 tab in PM   Week 4: Metformin 500m tabs in AM, 2 tabs in PM (for total dose of 1,000mg twice daily)     - Levemir (LONG ACTING INSULIN) - with your metformin start, decrease insulin from 28u twice daily --> 27u twice daily, continue to wean down by 2u every 2 days until you're waking up     - continue your short acting insulin (humalog) as needed using this scale: Take 1 unit for every 20 points over  140.  SCALE:  Less than 140 = no extra rapid acting insulin  140-160= 1 unit extra of short acting insulin  161-180= 2 units  181-200= 3 units  201-220= 4 units  221-240= 5 units  241-260= 6 units  261-280= 7 units  281-300= 8 units  301-320= 9 units  321-340= 10 units  341-360= 11 units  361-380= 12 units  381-400= 13 units  400-420= 14 units  -- If you don't eat, still do a correction if your sugar is high, up to three times per day.       Blood sugar targets:  Before breakfast: , 2 hours after meals: <180 (preferably <150), A1C goal: <7.0%  Time in Range goal is higher than 80% if using a continuous glucose monitor (Dexcom or Dayami).  Time in Range can be found within the Dexcom G7 orestes, on Clarity if using Dexcom G6, or on LibreView if using Dayami.    HOW TO TREAT LOW BLOOD SUGAR (Hypoglycemia)  Low blood sugar= Less than 70, or if you start to have symptoms (below)  Symptoms: Shaking or trembling, fast heart rate, extreme hunger, sweating, confusion/difficulty concentrating, dizziness.    How to treat a low blood sugar if you are able to eat/drink: The Rule of 15  If you are using continuous glucose monitor that says you are low, but you do not have any symptoms, verify on fingerstick that your blood sugar is actually low before treating.   Eat 15 grams of carbs (see examples below)  Check your blood sugar after 15 minutes. If it’s still below your target range, have another serving.   Repeat these steps until it’s in your target range. Once it’s in range, if you're nervous about your sugar going low again, have a protein source (ie, a spoonful of peanut butter).   If you have a CGM you want to look for how your arrow has changed. If you arrow is pointed up or sideways after 15 min, give your CGM more time OR check with a finger stick. Try not to eat more food until at least 15 min after the first BG check - otherwise you risk having a rebound high.  If you are experiencing symptoms and you are unable to  check your blood glucose for any reason, treat the hypoglycemia.  If someone has a low blood sugar and is unconscious: Don’t hesitate to call 911. If someone is unconscious and glucagon is not available or someone does not know how to use it, call 911 immediately.    To treat a low, I recommend you carry with you easy, pre-portioned treatment for low blood sugars that are 15G of carbs:   - Children sized squeeze pouch applesauce (high fiber + carbs help prevent too high of a spike)  - Small children's sized juicebox- 15g carb --> 4oz juice box  - Glucose tablets from [employer], you can find them near diabetes supplies --> Note, you will need to eat 3-4 tablets to get to 15g of carbs  - Children sized fruit snack pack- look for one with 15 grams of total carbohydrate  - 3-4 Starburst candies  - 1 pack of fun sized skittles  - Choice of how to treat your low is important. Complex carbs, or foods that containf ats along with carbs (like chocolate) can slow the absorption of glucose and should NOT be used to treat an emergency low      Diabetes annual care reminders:  - If you haven't, remember to complete a yearly diabetes eye exam. This exam is critical to preventing and treating eye conditions caused by diabetes that could potentially lead to vision loss. Once complete, please call your eye care provider and ask them to fax your latest report to our office. This will help us update our records. Our fax number is: [phone]  - If you ever have an upcoming surgery, please notify the office. We will make adjustments to your diabetes medications prior to the procedure.    Diet & Exercise:  Helpful apps for Carb Counting: remember, carb goal is 30-45g of carb per meal, plus 15-30g of carbs with snacks.   Increase your protein and fiber intake to feel araujo, longer.   If you want more dietary guidance, let me know and we can get you scheduled with Riana, our diabetes educator or a dietician who specializes in  diabetes  [facility]- Calorie counter and diet tracker, Chairish food search orestes or www.calorieking.com, LoseIt!, Carb Manager  Nutrition analyzer: Copy and paste any recipe into this analyzer, it will give you an estimated carb count for homemade recipes:  https://www.[email or web address]/recipe-nutrition-analyzer-6207692    General exercise guidelines from the [employer]:  Recommendations for Adults: Get at least 150 minutes per week of moderate-intensity aerobic activity or 75 minutes per week of vigorous aerobic activity, or a combination of both, preferably spread throughout the week.  Add moderate- to high-intensity muscle-strengthening activity (such as resistance or weights) on at least 2 days per week.  Spend less time sitting. Even light-intensity activity can offset some of the risks of being sedentary. Taking a 10-15 minute walk after meals is very beneficial to blood sugar regulation.  Increase amount and intensity of exercise gradually over time.      Thank you for visiting our office. We look forward to working with you to reach your health goals. As a reminder, if you need refills, please request early so there is enough time to process the request. We ask that you provide at least 5 days' notice before a refill is due, so there is time to send a request to pharmacy, process the refill, and ensure there are no other problems with obtaining the medication (backorders, prior authorization paperwork, etc). Routine refills will not be addressed on weekends, so please submit these requests during the week.

## 2024-01-18 NOTE — PROGRESS NOTES
[facility] Note    Name: Yefri Brothers    Date: 2024    HISTORY OF PRESENT ILLNESS   Yefri Brothers is a 38 year old male with PMHx significant for HTN, DM  who presents for  DM2 management.    Initial HPI consult in 2024  Last A1c value was 17.05% done 2023.  GISSELLE/IA-2, islet cell ab negative  Quit smoking    Diagnosed w/ DM age: 38, upon hospitalization for HHS/AG acidosis (DKA vs starvation ketosis?) 2023  P/w weakness, kussmaul respirations; previously undoctored, no medications/known medical history.   Upon presentation to ER, , Na 123, K 6.8, bicarb 13, Cr 3.17, AG 24  He's been doing great since hospitalization, still fatigued sometimes. He's only taking basal insulin, hardly using novolog because he has been trending so low. He changed his diet- no longer drinking juice, only water. Vision has improved but having problems with near vision.   Family history of DM: father's side  DM meds at first office visit: levemir 28u BID, novolog ISF 1:20>140    Blood Glucose log/CGM: reviewed using glucometer, checking 2-3 times per day. Morning/fastin, pre-meal: , episodes of hypoglycemia: Yes      Last A1c value was 17.05% done 2023.  Previously trialed/failed DM meds: metformin- felt depressed    REVIEW OF SYSTEMS  Ten point review of systems has been performed and is otherwise negative and/or non-contributory, except as described above.     Medications:     Current Outpatient Medications:     losartan 100 MG Oral Tab, Take 1 tablet (100 mg total) by mouth daily., Disp: 30 tablet, Rfl: 0    Glucose Blood (ONETOUCH VERIO) In Vitro Strip, Use glucose strips to check glucose 4 times daily, Disp: 100 strip, Rfl: 0    amLODIPine 10 MG Oral Tab, Take 1 tablet (10 mg total) by mouth daily., Disp: 30 tablet, Rfl: 0    insulin detemir (LEVEMIR FLEXTOUCH) 100 UNIT/ML Subcutaneous Solution Pen-injector, Inject 28 Units into the skin 2 (two) times daily., Disp: 15 mL, Rfl:  0    Insulin Lispro, 1 Unit Dial, (HUMALOG KWIKPEN) 100 UNIT/ML Subcutaneous Solution Pen-injector, Test blood glucose 4 times daily before meals Inject 1 unit of insulin for every 20 points blood glucose is greater than 140 mg/dL Inject insulin into the skin prior to meals. Max daily dose 50 units, Disp: 15 mL, Rfl: 0    omeprazole 20 MG Oral Capsule Delayed Release, Take 1 capsule (20 mg total) by mouth daily., Disp: 30 capsule, Rfl: 0    benzonatate 100 MG Oral Cap, Take 1 capsule (100 mg total) by mouth 3 (three) times daily as needed for cough. (Patient not taking: Reported on 2024), Disp: 21 capsule, Rfl: 0     Allergies:   No Known Allergies    Social History:   Social History     Socioeconomic History    Marital status: Single   Tobacco Use    Smoking status: Former     Packs/day: 2     Types: Cigarettes     Start date: 2008     Quit date: 2023     Years since quittin.0    Smokeless tobacco: Never   Substance and Sexual Activity    Alcohol use: Not Currently    Drug use: Not Currently       Medical History:   Reviewed    Surgical history:   History reviewed. No pertinent surgical history.    PHYSICAL EXAM  Vitals:    24 1514   BP: 132/86   BP Location: Right arm   Patient Position: Sitting   Cuff Size: large   Pulse: 95   SpO2: 97%   Weight: (!) 339 lb (153.8 kg)       General Appearance:  alert, well developed, in no acute distress  Eyes:  normal conjunctivae, sclera  Ears/Nose/Mouth/Throat/Neck:  no palpable thyroid nodules   Cardiovascular:  regular rate  Respiratory:  breathing comfortably  Musculoskeletal:  normal muscle strength and tone  Skin:  normal moisture and skin texture  Hair & Nails:  normal scalp hair     Psychiatric:  oriented to time, self, and place  Neuro:  sensory grossly intact and motor grossly intact    Labs/Imaging: Reviewed.    ASSESSMENT/PLAN:    (E11.9) New onset type 2 diabetes mellitus (HCC)  (primary encounter diagnosis)  Plan:   Last A1c value was 17.05%  done 12/17/2023. Hx of newly diagnosed T2DM w/ recent admission for HHS/AGMA (unclear if truly DKA v starvation ketosis) now much improved w/ addition of MDI. MIRIAN ab workup negative.  A1C not to goal. Plan to start metformin, incr slowly w/ goal of reducing insulin needs. Once tolerating metformin plan to add GLP1a. Medicaid will not approve mounjaro/ozempic without trial of rybelsus or trulicity first so we will go that route.    We discussed the importance of glycemic control to prevent complications of diabetes. We discussed the importance of SBGM and consistent, low carb diet as well as moderate exercise as well.  We also discussed the complications of diabetes include retinopathy, neuropathy, nephropathy and cardiovascular disease.      - decr levemir 28u --> 27u BID, wean by 2u q2 days to FBG <130 with metformin start  - start metformin 500mg daily, incr to go 1G BID over next few weeks as tolerated (GFR 82 12/2023)  - continue humalog ISF 1:20>140 TID AC  - start Dexcom G7 CGM with phone (connected to clinic profile)  - meet with [facility] DM educator re:carb aware diet  - continue to check BG 3-4x/day using glucometer or CGM    DM quality metrics:  - Ophtho: No data recorded No data recorded- due, reminded  - BP is controlled, on ARB  - LDL - due for repeat, ordered, will need statin  - MAB negative 12/2023, on ARB  - Neuropathy/ Foot exam: No data recorded  - CAD: none    (R53.82) Chronic fatigue  Plan: Reviewed that fatigue is often multifactorial and we have reviewed the common etiologies, both endocrine and non-endocrine.  - repeat TFTs today  - no anemia per CBC   - pt reports snoring/ARTI -- follow up w/ PCP  - vit D- repeat, will order replacement if deficient  - reviewed if all above normal, pt will continue to follow up with PCP        Return in about 1 month (around 2/18/2024) for DM follow up, evaluation of insulin adjustments.    A total of 45 minutes was spent today on obtaining history, reviewing  pertinent labs, evaluating patient, providing multiple treatment options, reinforcing diet/exercise and compliance, and completing documentation.       1/18/2024  KIYA Phipps

## 2024-01-18 NOTE — PROGRESS NOTES
Dexcom G7 Start    Patient seen in clinic to start on new Dexcom device.  Patient given verbal instructions and demonstrated and completed teach back in the correct order before placing first sensor on leftarm.    Reviewed the following topics:    Functions of the sensor, phone/reader, and overlap patches  Test blood glucose if symptoms do not match sensor reading and calibrate system  How to handle problems like MRI, CT scans, travel, etc.   Explained how to change enter sensor code q 10 days  Showed pt how to change high/low alert as well as to see when sensor expires.  Reviewed how to remove sensor in 10 days.  Reviewed sensor expiration alerts.  Instructed pt not to inject insulin within 3 inches of sensor  Instructed pt they will have to return to fingerstick BG testing temporarily if out of sensors/reader  Reviewed packing/supplies    Phone:  - Downloaded Dexcom orestes on phone  - Invited/Connected to Jonas Mayers RN   DCES

## 2024-01-21 PROBLEM — E11.9 INSULIN DEPENDENT TYPE 2 DIABETES MELLITUS, CONTROLLED (HCC): Status: ACTIVE | Noted: 2024-01-21

## 2024-01-21 PROBLEM — Z79.4 INSULIN DEPENDENT TYPE 2 DIABETES MELLITUS, CONTROLLED (HCC): Status: ACTIVE | Noted: 2024-01-21

## 2024-02-02 ENCOUNTER — TELEPHONE (OUTPATIENT)
Facility: CLINIC | Age: 39
End: 2024-02-02

## 2024-02-02 NOTE — TELEPHONE ENCOUNTER
Received mailed letter saying patient now has [employer].    Letter in RN purple folder. Will need to follow up and confirm with patient.

## 2024-02-07 ENCOUNTER — TELEPHONE (OUTPATIENT)
Facility: CLINIC | Age: 39
End: 2024-02-07

## 2024-02-07 NOTE — TELEPHONE ENCOUNTER
RN phoned pt; RN obtained verbal permission from pt to send his last office visit notes to [facility]. [facility] faxed request for pt's last o.v. notes in preparation for his upcoming diabetic eye exam on 2/21.    1/18/24 o.v. notes faxed to [facility]. Awaiting fax confirmation.

## 2024-02-07 NOTE — TELEPHONE ENCOUNTER
2/7/24-Received via fax a request for lov notes as patient is scheduled to be seen for a diabetic eye exam on 2/21/24.  Placed in PA in basket.

## 2024-02-13 ENCOUNTER — NURSE ONLY (OUTPATIENT)
Facility: CLINIC | Age: 39
End: 2024-02-13
Payer: MEDICAID

## 2024-02-13 DIAGNOSIS — E11.9 NEW ONSET TYPE 2 DIABETES MELLITUS (HCC): Primary | ICD-10-CM

## 2024-02-13 NOTE — PROGRESS NOTES
T2DM Nutrition and Lifestyle Counseling    Start Time: 3:35 pm   End Time:4:15 pm     Indication:  Patient recently dx with T2DM, initial diabetes education session     Recent A1C:  Last A1c value was 17.05% done 12/17/2023.     Current Diabetes Medications:  - decr levemir 28u --> 27u BID, wean by 2u q2 days to FBG <130 with metformin start  - start metformin 500mg daily, incr to go 1G BID over next few weeks as tolerated (GFR 82 12/2023)  - continue humalog ISF 1:20>140 TID AC    Blood Sugar Intake  Checking 3 times daily via BGM --> just got dexcom supplies yesterday, starting again soon     2/13 FBG - 171    2/12 FBG - 100    2/11 FBG - 85     Nutrition Intake: 2 meals a day    TYPICAL Food Notes   Breakfast  No coffee or tea in the AM, sometimes a fruit cup  skips   Lunch [place] with veggies / leftovers     Dinner Salad w/ grilled protein  Trying to eat smaller portions   Snacks Not a snack er     Drinks Just water       Sleep Patterns:  Usually 8 hours, most of the time -- gets up to use the bathroom 1-2 x per night     Stressors:  Was diabetes, has lessened     Physical Activity:  7 times a week for 20 minutes   Activities: weight lifting, starting to go the gym every other day     Patient Concerns:  - Thighs getting \"puffy\" after injecting insulin   --> discussed alternative sites (abdomen, arms, buttock)    Nutrition Topics Discussed   - Discussed basic meal planning guidelines for diabetes regular mealtime, limited concentrated sweets. Worked on establishing eating pattern/timing of meals and snacks    - Discussed in depth meal planning using the healthy eating with diabetes plate method with focus on balanced macronutrient consumption, including identifying foods that are carbohydrates, lean protein, non-starchy vegetables, and heart healthy fats   - Stressed importance of carbohydrate consistency in each meal, and importance of not skipping meals   - Recommended carbohydrate targets of 45-60 grams at  meals and 15-30 grams at snacks   - Educated on label reading   - Educated on portion control; including use of measuring cups, spoons, or food scale   - Provided suggestions for lower carb, healthy snacks   - Discussed how to handle special occasions, dining out, and eating on the go   - Discussed menu planning, healthy food shopping, cooking tips, and need to pre prepare foods    - Educated on emotional eating and being mindful at meals   - Emphasized the need for small, sustainable changes and working on SMART goals to encourage sustainable behaviors    - Instructions on how to use helpful websites or nutrition/tracking apps reviewed    - If applicable, taught to use carbohydrate to insulin ratio and insulin sensitivity factor     Lifestyle Topics Discussed  - Reinforced the importance of stress management and mental health support  - Discussed how to create SMART goals to help achieve milestones in diabetes care and management over time  - Recommended healthful mental tools to increase motivation for care  - Educated on the importance of intentional movement related to insulin resistance / absorption in the body, and discussed actionable ways to increase movement specific to the patient's interests  - Provided resources to connect patient with the larger diabetes community to build a greater support network     Goals:  - Continue watching portions and macro nutrient balance  - Re check A1C     Follow Up:  With Shahnaz on 2/15/2024     Riana WHEATLEY  BC-ADM

## 2024-02-15 ENCOUNTER — OFFICE VISIT (OUTPATIENT)
Facility: CLINIC | Age: 39
End: 2024-02-15
Payer: MEDICAID

## 2024-02-15 VITALS — SYSTOLIC BLOOD PRESSURE: 124 MMHG | DIASTOLIC BLOOD PRESSURE: 72 MMHG | OXYGEN SATURATION: 98 % | HEART RATE: 94 BPM

## 2024-02-15 DIAGNOSIS — R53.82 CHRONIC FATIGUE: ICD-10-CM

## 2024-02-15 DIAGNOSIS — E11.9 INSULIN DEPENDENT TYPE 2 DIABETES MELLITUS, CONTROLLED (HCC): ICD-10-CM

## 2024-02-15 DIAGNOSIS — Z79.4 INSULIN DEPENDENT TYPE 2 DIABETES MELLITUS, CONTROLLED (HCC): ICD-10-CM

## 2024-02-15 DIAGNOSIS — E11.9 NEW ONSET TYPE 2 DIABETES MELLITUS (HCC): Primary | ICD-10-CM

## 2024-02-15 PROCEDURE — 95251 CONT GLUC MNTR ANALYSIS I&R: CPT

## 2024-02-15 PROCEDURE — 99214 OFFICE O/P EST MOD 30 MIN: CPT

## 2024-02-15 RX ORDER — BLOOD SUGAR DIAGNOSTIC
STRIP MISCELLANEOUS
Qty: 300 STRIP | Refills: 1 | Status: SHIPPED | OUTPATIENT
Start: 2024-02-15

## 2024-02-15 RX ORDER — ORAL SEMAGLUTIDE 3 MG/1
3 TABLET ORAL DAILY
Qty: 30 TABLET | Refills: 0 | Status: SHIPPED | OUTPATIENT
Start: 2024-02-15 | End: 2024-03-16

## 2024-02-15 NOTE — PROGRESS NOTES
[facility]    Name: Yefri Brothers    Date: 2/15/2024    HISTORY OF PRESENT ILLNESS   Yefri Brothers is a 39 year old male with PMHx significant for HTN, DM  who presents for  DM2 management.    Initial HPI consult in 2024  Last A1c value was 17.05% done 2023.  GISSELLE/IA-2, islet cell ab negative  Quit smoking    Diagnosed w/ DM age: 38, upon hospitalization for HHS/AG acidosis (DKA vs starvation ketosis?) 2023  P/w weakness, kussmaul respirations; previously undoctored, no medications/known medical history.   Upon presentation to ER, , Na 123, K 6.8, bicarb 13, Cr 3.17, AG 24  He's been doing great since hospitalization, still fatigued sometimes. He's only taking basal insulin, hardly using novolog because he has been trending so low. He changed his diet- no longer drinking juice, only water. Vision has improved but having problems with near vision.   Family history of DM: father's side  DM meds at first office visit: levemir 28u BID, novolog ISF 1:20>140    Blood Glucose log/CGM: reviewed using glucometer, checking 2-3 times per day. Morning/fastin, pre-meal: , episodes of hypoglycemia: Yes    Interim hx:  2024- w/ endo APN, re: DM.Last A1c value was 17.05% done 2023.  Changes made at L[facility] include: started metformin and dexcom w/ goal of weaning insulin. Met with DCES re: carb aware diet.  MIRIAN ab negative.  Subjective updates since last office visit: Feeling well. Tolerating metformin start, feels better than he did when he was on it before. Liking dexcom   Checking BG with meter: , 171, pre meal: 146, 143  Current DM meds at visit: metformin 1000mg BID, levemir 28u BID, humalog ISF 1:20>140 TID AC, (not really needing it), dexcom G7    Continuous Glucose Monitoring Interpretation:  Yefri Brothers has undergone continuous glucose monitoring with their CGM.  The blood glucose tracings were evaluated for two weeks prior to office visit. Blood  glucose tracings demonstrated areas of hyperglycemia in the evening  There were no areas of hypoglycemia noted.       Last A1c value was 17.05% done 12/17/2023.  Previously trialed/failed DM meds: metformin- felt depressed    REVIEW OF SYSTEMS  Ten point review of systems has been performed and is otherwise negative and/or non-contributory, except as described above.     History/Other:   History  Medications:     Current Outpatient Medications:     Semaglutide (RYBELSUS) 3 MG Oral Tab, Take 3 mg by mouth daily., Disp: 30 tablet, Rfl: 0    losartan 100 MG Oral Tab, Take 1 tablet (100 mg total) by mouth daily., Disp: 30 tablet, Rfl: 0    metFORMIN  MG Oral Tablet 24 Hr, Take 2 tablets (1,000 mg total) by mouth 2 (two) times daily with meals., Disp: 360 tablet, Rfl: 1    Continuous Blood Gluc Sensor (DEXCOM G7 SENSOR) Does not apply Misc, 1 each Every 10 days., Disp: 9 each, Rfl: 1    Glucose Blood (ONETOUCH VERIO) In Vitro Strip, Use glucose strips to check glucose 4 times daily, Disp: 100 strip, Rfl: 0    insulin detemir (LEVEMIR FLEXTOUCH) 100 UNIT/ML Subcutaneous Solution Pen-injector, Inject 28 Units into the skin 2 (two) times daily., Disp: 15 mL, Rfl: 0    Insulin Lispro, 1 Unit Dial, (HUMALOG KWIKPEN) 100 UNIT/ML Subcutaneous Solution Pen-injector, Test blood glucose 4 times daily before meals Inject 1 unit of insulin for every 20 points blood glucose is greater than 140 mg/dL Inject insulin into the skin prior to meals. Max daily dose 50 units, Disp: 15 mL, Rfl: 0    omeprazole 20 MG Oral Capsule Delayed Release, Take 1 capsule (20 mg total) by mouth daily., Disp: 30 capsule, Rfl: 0    benzonatate 100 MG Oral Cap, Take 1 capsule (100 mg total) by mouth 3 (three) times daily as needed for cough. (Patient not taking: Reported on 1/2/2024), Disp: 21 capsule, Rfl: 0     Allergies:   No Known Allergies    Social History:   Social History     Socioeconomic History    Marital status: Single   Tobacco Use     Smoking status: Former     Packs/day: 2     Types: Cigarettes     Start date: 2008     Quit date: 2023     Years since quittin.1    Smokeless tobacco: Never   Substance and Sexual Activity    Alcohol use: Not Currently    Drug use: Not Currently       Medical History:   Reviewed    Surgical history:   History reviewed. No pertinent surgical history.    Objective:   Vitals:  Vitals:    02/15/24 1457   BP: 124/72   Pulse: 94   SpO2: 98%       Physical exam:  General Appearance:  alert, well developed, in no acute distress  Eyes:  normal conjunctivae, sclera  Respiratory:  breathing comfortably  Musculoskeletal:  normal muscle strength and tone  Skin:  normal moisture and skin texture  Hair & Nails:  normal scalp hair     Psychiatric:  oriented to time, self, and place  Neuro:  sensory grossly intact and motor grossly intact    Labs/Imaging: Pertinent imaging reviewed.    Wt Readings from Last 6 Encounters:   24 (!) 339 lb (153.8 kg)   24 (!) 339 lb (153.8 kg)   24 (!) 332 lb (150.6 kg)   23 (!) 335 lb (152 kg)   23 (!) 310 lb 6.5 oz (140.8 kg)       Assessment & Plan:   (E11.9) New onset type 2 diabetes mellitus (HCC)  (primary encounter diagnosis)  Plan:   Last A1c value was 17.05% done 2023.Hx of newly diagnosed T2DM w/ recent admission for HHS/AGMA (unclear if truly DKA v starvation ketosis) now much improved w/ addition of MDI. MIRIAN ab workup negative.  Now that tolerating metformin, plan to add GLP1a. Medicaid will not approve mounjaro/ozempic without trial of rybelsus or trulicity, discussed and decided on rybelsus start.    Discussed adding GLP-1 to current medication regimen. Discussed action, risk vs benefit, dosing, and potential side effects. Patient denies hx of pancreatitis, gastroparesis, or personal or family hx of medullary thyroid CA or MEN 2.      - switch detemir --> lantus for once daily dosing- take 55u qAM; goal to wean with GLP  - start rybelsus  3mg daily, take first thing on empty stomach  - continue metformin 1G BID (GFR 82 12/2023)  - continue humalog ISF 1:20>140 TID AC  - pt prefers to DC his dexcom g7; can switch to glucometer, strip refill sent  - met with DCES re: carb aware diet  - continue to check BG 3-4x/day using glucometer or CGM      DM quality metrics:  - Ophtho: No data recorded No data recorded- due, reminded  - BP is controlled, on ARB  - LDL - due for repeat, ordered reminded today 2/15/2024, will need statin  - MAB negative 12/2023, on ARB  - Neuropathy/ Foot exam: No data recorded  - CAD: none    (R53.82) Chronic fatigue  Plan: Reviewed that fatigue is often multifactorial and we have reviewed the common etiologies, both endocrine and non-endocrine.  - repeat TFTs today- due, reminded 2/15/2024   - no anemia per CBC   - pt reports snoring/ARTI -- follow up w/ PCP  - vit D- repeat, will order replacement if deficient- due, reminded 2/15/2024  - reviewed if all above normal, pt will continue to follow up with PCP      Return in about 1 month (around 3/15/2024) for phone visit - rybelsus check in.    A total of 30 minutes was spent today on obtaining history, reviewing pertinent labs, evaluating patient, providing multiple treatment options, reinforcing diet/exercise and compliance, and completing documentation.     2/15/2024  KIYA Phipps

## 2024-02-15 NOTE — PATIENT INSTRUCTIONS
Plan:  - complete A1C downstairs in lab today (our in office A1C machine currently not working)   - complete your diabetes eye exam. This exam is critical to preventing and treating eye conditions caused by diabetes that could potentially lead to vision loss. Once complete, please call your eye care provider and ask them to fax your latest report to our office. This will help us update our records. Our fax number is: [phone]     Medications:   - Rybelsus is a once daily tablet and has very specific dosing instructions: take on an empty stomach with 4 oz or less of water, then nothing by mouth for 30 minutes (can not be taken at same time as other oral medications).  I usually have people keep it on their bedside table with some water so they take it right when the alarm goes off, then by time up and moving it's already ok to take other meds or eat.     - start rybelsus 3mg daily for 30 days  - levemir 28u twice daily --> let's switch to lantus (insulin glargine) 55u every morning-- to switch, the night before you're about to start, decrease your levemir at night to just 10u before bed. The next morning, take lantus (new long acting insulin) 45u that morning, then the next morning you can start the 55u every morning dose  - metformin 1000mg twice daily  - humalog- continue 1u for every 20 points over 140 up to three times daily    One of the most common side effects of GLP1 medications (including mounjaro, Ozempic, Trulicity, Rybelsus) is nausea and GI upset.    Try to continue taking your medication. Side effects will likely get better with time.  Here is some advice to reduce these side effects:  - if you're having nausea and ate a large meal before the nausea, the volume of food may be too much for your stomach to handle, reduce meal size  - practice mindfulness to stop eating once full  - avoid eating when not hungry  - avoid high-fat or spicy food (particularly during the initial dose-escalation period)  -  moderate your intake of alcohol and fizzy drinks (particularly in the context of nausea and heartburn)  - If you have constipation at baseline, make sure you are drinking enough water and eating enough fiber  - if you notice some nausea symptoms and haven't eaten in a while, make sure you are eating at least three meals per day, can try snacking on high protein snack item like yogurt, string cheese, cottage cheese, protein shake  - If you have made these changes and you still notice nausea, please contact the clinic    For any pump or CGM, if you have issues with it falling off or sticking, always contact the company customer service for a failed sensor. They typically will ship you a replacement so you don't run out of supplies early.    If you have a hard time removing your sensor or pump:  - There is a product called 'Unisolve' --> [employer] Unisolve - helps a lot with removing Dexcom or Dayami sensors - can purchase wipes or liquid to apply on a cotton ball for site removal, on [employer]    If you're having trouble with your CGM sticking, here are some helpful tricks:  - clean your skin with alcohol wipes, let completely dry  - if you are applying to a site with hair, shave the hair  - There are wipes called 'skin tac'. After cleaning your skin, apply a thin layet of skin tac. Let it dry for 2 minutes, then apply the CGM   - There are a variety of 'over patches' that go over the CGM site to keep it intact. Popular brands are \"Skin \", there are also over patches on SmartVault  - If you're having skin irritation, there are underpatches as well  - there are many online diabetes communities where other people share tips and tricks, such as on reddit.com, sometimes patients find these forums helpful for finding ways to reduce skin irritation or CGM not sticking    SKIN TAC:    CAVILON WIPES:              Blood sugar targets:  Before breakfast: , 2 hours after meals: <180 (preferably <150), A1C goal:  <7.0%  Time in Range goal is higher than 80% if using a continuous glucose monitor (Dexcom or Dayami).  Time in Range can be found within the Dexcom G7 orestes, on Clarity if using Dexcom G6, or on LibreView if using Dayami.    HOW TO TREAT LOW BLOOD SUGAR (Hypoglycemia)  Low blood sugar= Less than 70, or if you start to have symptoms (below)  Symptoms: Shaking or trembling, fast heart rate, extreme hunger, sweating, confusion/difficulty concentrating, dizziness.    How to treat a low blood sugar if you are able to eat/drink: The Rule of 15  If you are using continuous glucose monitor that says you are low, but you do not have any symptoms, verify on fingerstick that your blood sugar is actually low before treating.   Eat 15 grams of carbs (see examples below)  Check your blood sugar after 15 minutes. If it’s still below your target range, have another serving.   Repeat these steps until it’s in your target range. Once it’s in range, if you're nervous about your sugar going low again, have a protein source (ie, a spoonful of peanut butter).   If you have a CGM you want to look for how your arrow has changed. If you arrow is pointed up or sideways after 15 min, give your CGM more time OR check with a finger stick. Try not to eat more food until at least 15 min after the first BG check - otherwise you risk having a rebound high.  If you are experiencing symptoms and you are unable to check your blood glucose for any reason, treat the hypoglycemia.  If someone has a low blood sugar and is unconscious: Don’t hesitate to call 911. If someone is unconscious and glucagon is not available or someone does not know how to use it, call 911 immediately.    To treat a low, I recommend you carry with you easy, pre-portioned treatment for low blood sugars that are 15G of carbs:   - Children sized squeeze pouch applesauce (high fiber + carbs help prevent too high of a spike)  - Small children's sized juicebox- 15g carb --> 4oz juice  box  - Glucose tablets from [facility], you can find them near diabetes supplies --> Note, you will need to eat 3-4 tablets to get to 15g of carbs  - Children sized fruit snack pack- look for one with 15 grams of total carbohydrate  - 3-4 Starburst candies  - 1 pack of fun sized skittles  - Choice of how to treat your low is important. Complex carbs, or foods that containf ats along with carbs (like chocolate) can slow the absorption of glucose and should NOT be used to treat an emergency low    NAVIGATING INSURANCE / PRICING OF MEDICATIONS WITH DIABETES:  Diabetes medications and supplies can be costly. The best way to reduce costs relating to your diabetes is to contact your insurance at the start of the year directly.  If you have questions about the pricing of any of your diabetes supplies or medications, please reach out directly to your insurance pharmacy plan (phone number on the back of your card), or look online at their website and their 'formulary'.   Then ask the following questions:    1.    What is the formulary preferred brand of my medication / supply (ie: my doctor ordered Ozempic, is that preferred or is another agent preferred)? Do you prefer a generic over brand name?  2.    What quantity is preferred by my plan? (30 or 90 day supply)  3.    Will my price be lower if I use a specific local or mail order pharmacy? (For example, [facility] is sometimes preferred, or a mail order service like [facility] or [facility])  4.    What is my estimated pricing for these medications / supplies if all rules are followed?  5.     Do I have a deductible I have to meet before my medications will be fully covered? What is my deductible? (In that case, you often will pay full price until you reach your deductible)    For CGM's (Dexcom or Dayami) specifically, if your pharmacy plan states that they do NOT offer coverage/there is a high copay at your pharmacy, ask to be transferred to your plans Durable  Medical Equipment team, or to be give a direct contact number for that team if they cannot transfer.  Your plan may prefer that your CGM goes through a medical supplier rather than pharmacy coverage.  If this is the case, please ask the following questions:    1.    Which durable medical equipment suppliers do you have a contract with for CGMs specifically (not all DME's supply CGMs!)  2.    What is the estimated pricing for my CGM supplies through medical coverage?        Diabetes annual care reminders:  - If you haven't, remember to complete a yearly diabetes eye exam. This exam is critical to preventing and treating eye conditions caused by diabetes that could potentially lead to vision loss. Once complete, please call your eye care provider and ask them to fax your latest report to our office. This will help us update our records. Our fax number is: [phone]  - If you ever have an upcoming surgery, please notify the office. We will make adjustments to your diabetes medications prior to the procedure.    Diet & Exercise:  Helpful apps for Carb Counting: remember, eating at low carb goal is 30-45g, or more normal carb goal of 45-60g of carb per meal, plus 15-30g of carbs with snacks.   Increase your protein (aim for 25-30g per meal, roughly) and increase your fiber intake to feel araujo, longer.  Fiber can also be beneficial for gut health and cholesterol.  If you want more dietary guidance, let me know and we can get you scheduled with Riana, our diabetes educator or a dietician who specializes in diabetes  MyFitnessPal- Calorie counter and diet tracker, [place] food search orestes or www.calorieking.com, LoseIt!, Carb Manager  Nutrition analyzer: Copy and paste any recipe into this analyzer, it will give you an estimated carb count for homemade recipes:  https://www.6Waves.Shareable Ink/recipe-nutrition-analyzer-3294585    General exercise guidelines from the [employer]:  Recommendations for Adults:  Get at least 150 minutes per week of moderate-intensity aerobic activity or 75 minutes per week of vigorous aerobic activity, or a combination of both, preferably spread throughout the week.  Add moderate- to high-intensity muscle-strengthening activity (such as resistance or weights) on at least 2 days per week.  Spend less time sitting. Even light-intensity activity can offset some of the risks of being sedentary. Taking a 10-15 minute walk after meals is very beneficial to blood sugar regulation.  Increase amount and intensity of exercise gradually over time.      Thank you for visiting our office. We look forward to working with you to reach your health goals. As a reminder, if you need refills, please request early so there is enough time to process the request. We ask that you provide at least 5 days' notice before a refill is due, so there is time to send a request to pharmacy, process the refill, and ensure there are no other problems with obtaining the medication (backorders, prior authorization paperwork, etc). Routine refills will not be addressed on weekends, so please submit these requests during the week.

## 2024-02-16 ENCOUNTER — TELEPHONE (OUTPATIENT)
Facility: CLINIC | Age: 39
End: 2024-02-16

## 2024-02-16 NOTE — TELEPHONE ENCOUNTER
2/16/24-Received via fax from [employer] Approval of rybelsus 3mg tablet effective 1/1/24-2/16/25. Sent to scan

## 2024-04-17 ENCOUNTER — MED REC SCAN ONLY (OUTPATIENT)
Facility: CLINIC | Age: 39
End: 2024-04-17

## 2025-08-27 ENCOUNTER — HOSPITAL ENCOUNTER (EMERGENCY)
Age: 40
Discharge: LEFT BEFORE DISCHARGE | End: 2025-08-27

## 2025-08-27 ENCOUNTER — APPOINTMENT (OUTPATIENT)
Dept: GENERAL RADIOLOGY | Age: 40
End: 2025-08-27
Attending: STUDENT IN AN ORGANIZED HEALTH CARE EDUCATION/TRAINING PROGRAM

## 2025-08-27 ENCOUNTER — HOSPITAL ENCOUNTER (INPATIENT)
Age: 40
Discharge: HOME | End: 2025-08-27
Attending: STUDENT IN AN ORGANIZED HEALTH CARE EDUCATION/TRAINING PROGRAM | Admitting: INTERNAL MEDICINE

## 2025-08-27 VITALS
SYSTOLIC BLOOD PRESSURE: 186 MMHG | DIASTOLIC BLOOD PRESSURE: 120 MMHG | BODY MASS INDEX: 26.51 KG/M2 | WEIGHT: 200 LBS | HEART RATE: 99 BPM | OXYGEN SATURATION: 100 % | HEIGHT: 73 IN | TEMPERATURE: 98.1 F | RESPIRATION RATE: 16 BRPM

## 2025-08-27 DIAGNOSIS — R73.9 HYPERGLYCEMIA: ICD-10-CM

## 2025-08-27 DIAGNOSIS — I10 HYPERTENSION, UNSPECIFIED TYPE: ICD-10-CM

## 2025-08-27 DIAGNOSIS — M79.89 FINGER SWELLING: Primary | ICD-10-CM

## 2025-08-27 LAB
ALBUMIN SERPL-MCNC: 4 G/DL (ref 3.4–5)
ALBUMIN/GLOB SERPL: 1.2 {RATIO} (ref 1–2.4)
ALP SERPL-CCNC: 104 UNITS/L (ref 45–117)
ALT SERPL-CCNC: 17 UNITS/L
ANION GAP SERPL CALC-SCNC: 17 MMOL/L (ref 7–19)
AST SERPL-CCNC: 13 UNITS/L
B-OH-BUTYR SERPL-SCNC: 2.8 MMOL/L (ref 0–0.3)
BASE EXCESS / DEFICIT, VENOUS - RESPIRATORY: -2 MMOL/L (ref -2–2)
BASOPHILS # BLD: 0 K/MCL (ref 0–0.3)
BASOPHILS NFR BLD: 0 %
BDY SITE: ABNORMAL
BILIRUB SERPL-MCNC: 1.3 MG/DL (ref 0.2–1)
BODY TEMPERATURE: 37 DEGREES C
BUN SERPL-MCNC: 11 MG/DL (ref 6–20)
BUN/CREAT SERPL: 9 (ref 7–25)
CA-I BLD-SCNC: 1.1 MMOL/L (ref 1.15–1.29)
CALCIUM SERPL-MCNC: 9.5 MG/DL (ref 8.4–10.2)
CHLORIDE SERPL-SCNC: 95 MMOL/L (ref 97–110)
CO2 SERPL-SCNC: 19 MMOL/L (ref 21–32)
COHGB MFR BLDV: 3 %
CONDITION: ABNORMAL
CREAT SERPL-MCNC: 1.2 MG/DL (ref 0.67–1.17)
DEPRECATED RDW RBC: 35.5 FL (ref 39–50)
EGFRCR SERPLBLD CKD-EPI 2021: 79 ML/MIN/{1.73_M2}
EOSINOPHIL # BLD: 0.1 K/MCL (ref 0–0.5)
EOSINOPHIL NFR BLD: 1 %
ERYTHROCYTE [DISTWIDTH] IN BLOOD: 12.7 % (ref 11–15)
FASTING DURATION TIME PATIENT: ABNORMAL H
GLOBULIN SER-MCNC: 3.4 G/DL (ref 2–4)
GLUCOSE BLDC GLUCOMTR-MCNC: 536 MG/DL (ref 70–99)
GLUCOSE SERPL-MCNC: 555 MG/DL (ref 70–99)
HCO3 BLDV-SCNC: 21 MMOL/L (ref 22–28)
HCT VFR BLD CALC: 46.4 % (ref 39–51)
HGB BLD-MCNC: 16.7 G/DL (ref 13–17)
HGB BLD-MCNC: 17.7 G/DL (ref 13–17)
IMM GRANULOCYTES # BLD AUTO: 0 K/MCL (ref 0–0.2)
IMM GRANULOCYTES # BLD: 0 %
LACTATE BLDV-SCNC: 1.4 MMOL/L (ref 0–2)
LACTATE BLDV-SCNC: 1.5 MMOL/L
LYMPHOCYTES # BLD: 2.4 K/MCL (ref 1–4.8)
LYMPHOCYTES NFR BLD: 29 %
MCH RBC QN AUTO: 28 PG (ref 26–34)
MCHC RBC AUTO-ENTMCNC: 36 G/DL (ref 32–36.5)
MCV RBC AUTO: 77.9 FL (ref 78–100)
METHGB MFR BLDMV: 1.1 %
MONOCYTES # BLD: 0.6 K/MCL (ref 0.3–0.9)
MONOCYTES NFR BLD: 8 %
NEUTROPHILS # BLD: 5 K/MCL (ref 1.8–7.7)
NEUTROPHILS NFR BLD: 62 %
NRBC BLD MANUAL-RTO: 0 /100 WBC
OXYHGB MFR BLDV: 93.5 % (ref 60–80)
PCO2 BLDV: 31 MM HG (ref 41–54)
PH BLDV: 7.43 UNITS (ref 7.35–7.45)
PLATELET # BLD AUTO: 276 K/MCL (ref 140–450)
PO2 BLDV: 74 MM HG (ref 35–42)
POTASSIUM BLD-SCNC: 3.6 MMOL/L (ref 3.4–5.1)
POTASSIUM SERPL-SCNC: 3.6 MMOL/L (ref 3.4–5.1)
PROT SERPL-MCNC: 7.4 G/DL (ref 6.4–8.2)
RBC # BLD: 5.96 MIL/MCL (ref 4.5–5.9)
SAO2 DF BLDV: 98 % (ref 60–80)
SODIUM BLD-SCNC: 125 MMOL/L (ref 135–145)
SODIUM SERPL-SCNC: 127 MMOL/L (ref 135–145)
WBC # BLD: 8.2 K/MCL (ref 4.2–11)

## 2025-08-27 PROCEDURE — 82805 BLOOD GASES W/O2 SATURATION: CPT

## 2025-08-27 PROCEDURE — 82330 ASSAY OF CALCIUM: CPT | Performed by: NURSE PRACTITIONER

## 2025-08-27 PROCEDURE — 99282 EMERGENCY DEPT VISIT SF MDM: CPT

## 2025-08-27 PROCEDURE — 83605 ASSAY OF LACTIC ACID: CPT

## 2025-08-27 PROCEDURE — 80053 COMPREHEN METABOLIC PANEL: CPT | Performed by: NURSE PRACTITIONER

## 2025-08-27 PROCEDURE — 82330 ASSAY OF CALCIUM: CPT

## 2025-08-27 PROCEDURE — 83605 ASSAY OF LACTIC ACID: CPT | Performed by: NURSE PRACTITIONER

## 2025-08-27 PROCEDURE — 82962 GLUCOSE BLOOD TEST: CPT

## 2025-08-27 PROCEDURE — 85018 HEMOGLOBIN: CPT

## 2025-08-27 PROCEDURE — 82010 KETONE BODYS QUAN: CPT | Performed by: NURSE PRACTITIONER

## 2025-08-27 PROCEDURE — 99281 EMR DPT VST MAYX REQ PHY/QHP: CPT

## 2025-08-27 PROCEDURE — 85025 COMPLETE CBC W/AUTO DIFF WBC: CPT | Performed by: NURSE PRACTITIONER

## 2025-08-27 PROCEDURE — 84295 ASSAY OF SERUM SODIUM: CPT

## 2025-08-27 PROCEDURE — 84132 ASSAY OF SERUM POTASSIUM: CPT

## 2025-08-27 ASSESSMENT — PAIN SCALES - GENERAL: PAINLEVEL_OUTOF10: 6

## 2025-08-28 PROBLEM — M65.949 FLEXOR TENOSYNOVITIS OF FINGER: Status: ACTIVE | Noted: 2025-08-28

## 2025-08-28 PROBLEM — E11.10 DKA, TYPE 2, NOT AT GOAL (CMD): Status: ACTIVE | Noted: 2025-08-28

## 2025-08-28 LAB
CA-I ADJ PH7.4 SERPL-SCNC: 1.22 MMOL/L (ref 1.15–1.29)
CA-I SERPL ISE-SCNC: 1.22 MMOL/L (ref 1.15–1.29)
RAINBOW EXTRA TUBES HOLD SPECIMEN: NORMAL

## 2025-08-28 SDOH — ECONOMIC STABILITY: GENERAL

## 2025-08-28 SDOH — HEALTH STABILITY: GENERAL: BECAUSE OF A PHYSICAL, MENTAL, OR EMOTIONAL CONDITION, DO YOU HAVE DIFFICULTY DOING ERRANDS ALONE?: NO

## 2025-08-28 SDOH — ECONOMIC STABILITY: FOOD INSECURITY: WITHIN THE PAST 12 MONTHS, THE FOOD YOU BOUGHT JUST DIDN'T LAST AND YOU DIDN'T HAVE MONEY TO GET MORE.: NEVER TRUE

## 2025-08-28 SDOH — ECONOMIC STABILITY: HOUSING INSECURITY: DO YOU HAVE PROBLEMS WITH ANY OF THE FOLLOWING?: NONE OF THE ABOVE

## 2025-08-28 SDOH — ECONOMIC STABILITY: HOUSING INSECURITY: WHAT IS YOUR LIVING SITUATION TODAY?: I HAVE A STEADY PLACE TO LIVE

## 2025-08-28 SDOH — HEALTH STABILITY: PHYSICAL HEALTH: DO YOU HAVE SERIOUS DIFFICULTY WALKING OR CLIMBING STAIRS?: NO

## 2025-08-28 SDOH — ECONOMIC STABILITY: TRANSPORTATION INSECURITY
IN THE PAST 12 MONTHS, HAS LACK OF RELIABLE TRANSPORTATION KEPT YOU FROM MEDICAL APPOINTMENTS, MEETINGS, WORK OR FROM GETTING THINGS NEEDED FOR DAILY LIVING?: PATIENT DECLINED

## 2025-08-28 SDOH — ECONOMIC STABILITY: INCOME INSECURITY
IN THE PAST 12 MONTHS, HAS THE ELECTRIC, GAS, OIL, OR WATER COMPANY THREATENED TO SHUT OFF SERVICE IN YOUR HOME?: PATIENT DECLINED

## 2025-08-28 SDOH — HEALTH STABILITY: GENERAL
BECAUSE OF A PHYSICAL, MENTAL, OR EMOTIONAL CONDITION, DO YOU HAVE SERIOUS DIFFICULTY CONCENTRATING, REMEMBERING OR MAKING DECISIONS?: NO

## 2025-08-28 SDOH — HEALTH STABILITY: PHYSICAL HEALTH: DO YOU HAVE DIFFICULTY DRESSING OR BATHING?: NO

## 2025-08-28 SDOH — SOCIAL STABILITY: SOCIAL NETWORK: SUPPORT SYSTEMS: FAMILY MEMBERS;FRIENDS

## 2025-08-28 SDOH — ECONOMIC STABILITY: HOUSING INSECURITY: WHAT IS YOUR LIVING SITUATION TODAY?: FAMILY MEMBERS

## 2025-08-28 SDOH — ECONOMIC STABILITY: HOUSING INSECURITY: WHAT IS YOUR LIVING SITUATION TODAY?: CONDO

## 2025-08-28 ASSESSMENT — PAIN SCALES - GENERAL
PAINLEVEL_OUTOF10: 1
PAINLEVEL_OUTOF10: 8
PAINLEVEL_OUTOF10: 7
PAINLEVEL_OUTOF10: 5
PAINLEVEL_OUTOF10: 8
PAINLEVEL_OUTOF10: 7
PAINLEVEL_OUTOF10: 7
PAINLEVEL_OUTOF10: 9
PAINLEVEL_OUTOF10: 1
PAINLEVEL_OUTOF10: 4
PAINLEVEL_OUTOF10: 2

## 2025-08-28 ASSESSMENT — COGNITIVE AND FUNCTIONAL STATUS - GENERAL
BECAUSE OF A PHYSICAL, MENTAL, OR EMOTIONAL CONDITION, DO YOU HAVE SERIOUS DIFFICULTY CONCENTRATING, REMEMBERING OR MAKING DECISIONS: NO
BECAUSE OF A PHYSICAL, MENTAL, OR EMOTIONAL CONDITION, DO YOU HAVE DIFFICULTY DOING ERRANDS ALONE: NO
DO YOU HAVE DIFFICULTY DRESSING OR BATHING: NO

## 2025-08-28 ASSESSMENT — PAIN SCALES - WONG BAKER
WONGBAKER_NUMERICALRESPONSE: 7
WONGBAKER_NUMERICALRESPONSE: 7
WONGBAKER_NUMERICALRESPONSE: 4
WONGBAKER_NUMERICALRESPONSE: 6
WONGBAKER_NUMERICALRESPONSE: 5
WONGBAKER_NUMERICALRESPONSE: 7

## 2025-08-28 ASSESSMENT — PATIENT HEALTH QUESTIONNAIRE - PHQ9
SUM OF ALL RESPONSES TO PHQ9 QUESTIONS 1 AND 2: 0
2. FEELING DOWN, DEPRESSED OR HOPELESS: NOT AT ALL
SUM OF ALL RESPONSES TO PHQ9 QUESTIONS 1 AND 2: 0
1. LITTLE INTEREST OR PLEASURE IN DOING THINGS: NOT AT ALL
IS PATIENT ABLE TO COMPLETE PHQ2 OR PHQ9: YES

## 2025-08-28 ASSESSMENT — COLUMBIA-SUICIDE SEVERITY RATING SCALE - C-SSRS
IS THE PATIENT ABLE TO COMPLETE C-SSRS: YES
6. HAVE YOU EVER DONE ANYTHING, STARTED TO DO ANYTHING, OR PREPARED TO DO ANYTHING TO END YOUR LIFE?: NO
2. HAVE YOU ACTUALLY HAD ANY THOUGHTS OF KILLING YOURSELF?: NO
1. WITHIN THE PAST MONTH, HAVE YOU WISHED YOU WERE DEAD OR WISHED YOU COULD GO TO SLEEP AND NOT WAKE UP?: NO

## 2025-08-28 ASSESSMENT — ACTIVITIES OF DAILY LIVING (ADL): ADL_SCORE: 12

## 2025-08-28 ASSESSMENT — ENCOUNTER SYMPTOMS: ENDOCRINE COMMENTS: ELEVATED BLOOD SUGARS

## 2025-08-29 ENCOUNTER — ANESTHESIA (OUTPATIENT)
Dept: SURGERY | Age: 40
End: 2025-08-29

## 2025-08-29 ENCOUNTER — ANESTHESIA EVENT (OUTPATIENT)
Dept: SURGERY | Age: 40
End: 2025-08-29

## 2025-08-29 PROCEDURE — 10002807 HB RX 258: Performed by: ANESTHESIOLOGY

## 2025-08-29 PROCEDURE — 10002800 HB RX 250 W HCPCS: Performed by: ANESTHESIOLOGY

## 2025-08-29 PROCEDURE — 10002801 HB RX 250 W/O HCPCS: Performed by: ANESTHESIOLOGY

## 2025-08-29 RX ORDER — DEXAMETHASONE SODIUM PHOSPHATE 4 MG/ML
INJECTION, SOLUTION INTRA-ARTICULAR; INTRALESIONAL; INTRAMUSCULAR; INTRAVENOUS; SOFT TISSUE PRN
Status: DISCONTINUED | OUTPATIENT
Start: 2025-08-29 | End: 2025-08-29

## 2025-08-29 RX ORDER — SODIUM CHLORIDE 9 MG/ML
INJECTION, SOLUTION INTRAVENOUS CONTINUOUS PRN
Status: DISCONTINUED | OUTPATIENT
Start: 2025-08-29 | End: 2025-08-29

## 2025-08-29 RX ORDER — PROPOFOL 10 MG/ML
INJECTION, EMULSION INTRAVENOUS PRN
Status: DISCONTINUED | OUTPATIENT
Start: 2025-08-29 | End: 2025-08-29

## 2025-08-29 RX ORDER — ROCURONIUM BROMIDE 10 MG/ML
INJECTION, SOLUTION INTRAVENOUS PRN
Status: DISCONTINUED | OUTPATIENT
Start: 2025-08-29 | End: 2025-08-29

## 2025-08-29 RX ORDER — EPHEDRINE SULFATE/0.9% NACL/PF 50 MG/10ML
SYRINGE (ML) INTRAVENOUS PRN
Status: DISCONTINUED | OUTPATIENT
Start: 2025-08-29 | End: 2025-08-29

## 2025-08-29 RX ORDER — LIDOCAINE HYDROCHLORIDE 20 MG/ML
INJECTION, SOLUTION INFILTRATION; PERINEURAL PRN
Status: DISCONTINUED | OUTPATIENT
Start: 2025-08-29 | End: 2025-08-29

## 2025-08-29 RX ORDER — ONDANSETRON 2 MG/ML
INJECTION INTRAMUSCULAR; INTRAVENOUS PRN
Status: DISCONTINUED | OUTPATIENT
Start: 2025-08-29 | End: 2025-08-29

## 2025-08-29 RX ADMIN — EPHEDRINE SULFATE 10 MG: 5 INJECTION INTRAVENOUS at 12:10

## 2025-08-29 RX ADMIN — FENTANYL CITRATE 100 MCG: 50 INJECTION INTRAMUSCULAR; INTRAVENOUS at 11:38

## 2025-08-29 RX ADMIN — LIDOCAINE HYDROCHLORIDE 5 ML: 20 INJECTION, SOLUTION INFILTRATION; PERINEURAL at 11:38

## 2025-08-29 RX ADMIN — EPHEDRINE SULFATE 10 MG: 5 INJECTION INTRAVENOUS at 12:03

## 2025-08-29 RX ADMIN — Medication 140 MG: at 11:38

## 2025-08-29 RX ADMIN — DEXAMETHASONE SODIUM PHOSPHATE 4 MG: 4 INJECTION INTRA-ARTICULAR; INTRALESIONAL; INTRAMUSCULAR; INTRAVENOUS; SOFT TISSUE at 11:38

## 2025-08-29 RX ADMIN — ONDANSETRON 4 MG: 2 INJECTION INTRAMUSCULAR; INTRAVENOUS at 11:38

## 2025-08-29 RX ADMIN — SODIUM CHLORIDE: 9 INJECTION, SOLUTION INTRAVENOUS at 11:34

## 2025-08-29 RX ADMIN — ROCURONIUM BROMIDE 5 MG: 10 INJECTION INTRAVENOUS at 11:38

## 2025-08-29 RX ADMIN — PROPOFOL 200 MG: 10 INJECTION, EMULSION INTRAVENOUS at 11:38

## 2025-08-29 RX ADMIN — FENTANYL CITRATE 50 MCG: 50 INJECTION INTRAMUSCULAR; INTRAVENOUS at 12:07

## 2025-08-29 ASSESSMENT — ENCOUNTER SYMPTOMS
COUGH: 0
FEVER: 0
SINUS PAIN: 0
FATIGUE: 0
WOUND: 0
EYES NEGATIVE: 1
CONSTITUTIONAL NEGATIVE: 1
CONSTIPATION: 0
CHILLS: 0
LIGHT-HEADEDNESS: 0
DIARRHEA: 0
VOMITING: 0
ABDOMINAL DISTENTION: 0
ALLERGIC/IMMUNOLOGIC NEGATIVE: 1
SORE THROAT: 0
HEMATOLOGIC/LYMPHATIC NEGATIVE: 1
HEADACHES: 0
ENDOCRINE NEGATIVE: 1
RESPIRATORY NEGATIVE: 1
SINUS PRESSURE: 0
UNEXPECTED WEIGHT CHANGE: 0
ABDOMINAL PAIN: 0
NEUROLOGICAL NEGATIVE: 1
COLOR CHANGE: 0
BACK PAIN: 0
ACTIVITY CHANGE: 0
ROS SKIN COMMENTS: SEE HPI
PSYCHIATRIC NEGATIVE: 1
SHORTNESS OF BREATH: 0
DIZZINESS: 0
NAUSEA: 0

## 2025-08-29 ASSESSMENT — PAIN SCALES - GENERAL
PAINLEVEL_OUTOF10: 0
PAINLEVEL_OUTOF10: 8
PAINLEVEL_OUTOF10: 0
PAINLEVEL_OUTOF10: 4
PAINLEVEL_OUTOF10: 0
PAINLEVEL_OUTOF10: 4
PAINLEVEL_OUTOF10: 0

## 2025-08-29 ASSESSMENT — PAIN SCALES - WONG BAKER
WONGBAKER_NUMERICALRESPONSE: 3
WONGBAKER_NUMERICALRESPONSE: 6

## 2025-08-30 ASSESSMENT — PAIN SCALES - GENERAL
PAINLEVEL_OUTOF10: 8
PAINLEVEL_OUTOF10: 6
PAINLEVEL_OUTOF10: 6
PAINLEVEL_OUTOF10: 2
PAINLEVEL_OUTOF10: 6
PAINLEVEL_OUTOF10: 0

## 2025-08-30 ASSESSMENT — PAIN SCALES - WONG BAKER: WONGBAKER_NUMERICALRESPONSE: 1

## 2025-08-31 ASSESSMENT — PAIN SCALES - GENERAL
PAINLEVEL_OUTOF10: 0
PAINLEVEL_OUTOF10: 0

## 2025-09-01 ASSESSMENT — PAIN SCALES - GENERAL
PAINLEVEL_OUTOF10: 3
PAINLEVEL_OUTOF10: 6

## (undated) NOTE — LETTER
The [facility]  Scheduling the Birth of Austen Moore    You are scheduled for a  delivery on ***. You should arrive at the [facility] at ***. Please follow the enclosed antimicrobial wash instructions. This wash should be started 2 days prior to surgery and be continued until the day of surgery, for a total of 3 washes. Unless otherwise instructed by your physician, DO NOT eat or drink anything within 6 hours of your arrival to the [facility]. If you have not preregistered, please mail in your preregistration forms. The [facility] is located on the 3rd floor of [facility].  Please use the east elevators. When you arrive, you will be brought to your room and asked to change into a hospital gown. Your nurse will take your temperature, blood pressure, and pulse and place you on the fetal monitor to monitor the baby's well being and any uterine activity you may be experiencing prior to your delivery. Your nurse will also ask you some questions, start an intravenous (IV) line, and possibly draw some blood if your lab tests were not completed prior to your arrival.  You will also sign a consent for surgery. Your partner will be asked to change into scrubs so that he/she can be with you in the operating room for the birth of your child. There are no cameras or video cameras allowed in the operating room. You will be interviewed by the anesthesiologist, support stockings will be applied to your lower legs, and you will be shaved at the incision site. When surgery is completed, you and your partner will be taken to the recovery room for at least an hour prior to your return to your room. Please feel free to contact the [facility] with any questions or concerns @ [phone].

## (undated) NOTE — LETTER
:  1985      To Whom It May Concern: This patient was seen in our hospital  2023- 23 and remains hospitalized         If this office may be of further assistance, please do not hesitate to contact us.       Sincerely,        Lia Benitez MD